# Patient Record
Sex: FEMALE | Race: WHITE | NOT HISPANIC OR LATINO | URBAN - METROPOLITAN AREA
[De-identification: names, ages, dates, MRNs, and addresses within clinical notes are randomized per-mention and may not be internally consistent; named-entity substitution may affect disease eponyms.]

---

## 2021-04-01 ENCOUNTER — APPOINTMENT (RX ONLY)
Dept: URBAN - METROPOLITAN AREA CLINIC 4 | Facility: CLINIC | Age: 56
Setting detail: DERMATOLOGY
End: 2021-04-01

## 2021-04-01 DIAGNOSIS — L57.3 POIKILODERMA OF CIVATTE: ICD-10-CM

## 2021-04-01 DIAGNOSIS — D18.0 HEMANGIOMA: ICD-10-CM

## 2021-04-01 DIAGNOSIS — Z71.89 OTHER SPECIFIED COUNSELING: ICD-10-CM

## 2021-04-01 DIAGNOSIS — L82.1 OTHER SEBORRHEIC KERATOSIS: ICD-10-CM

## 2021-04-01 DIAGNOSIS — L81.4 OTHER MELANIN HYPERPIGMENTATION: ICD-10-CM

## 2021-04-01 DIAGNOSIS — L57.0 ACTINIC KERATOSIS: ICD-10-CM

## 2021-04-01 DIAGNOSIS — D22 MELANOCYTIC NEVI: ICD-10-CM

## 2021-04-01 DIAGNOSIS — B07.8 OTHER VIRAL WARTS: ICD-10-CM

## 2021-04-01 DIAGNOSIS — L71.8 OTHER ROSACEA: ICD-10-CM

## 2021-04-01 PROBLEM — D22.62 MELANOCYTIC NEVI OF LEFT UPPER LIMB, INCLUDING SHOULDER: Status: ACTIVE | Noted: 2021-04-01

## 2021-04-01 PROBLEM — D22.72 MELANOCYTIC NEVI OF LEFT LOWER LIMB, INCLUDING HIP: Status: ACTIVE | Noted: 2021-04-01

## 2021-04-01 PROBLEM — D18.01 HEMANGIOMA OF SKIN AND SUBCUTANEOUS TISSUE: Status: ACTIVE | Noted: 2021-04-01

## 2021-04-01 PROBLEM — D22.5 MELANOCYTIC NEVI OF TRUNK: Status: ACTIVE | Noted: 2021-04-01

## 2021-04-01 PROBLEM — D22.61 MELANOCYTIC NEVI OF RIGHT UPPER LIMB, INCLUDING SHOULDER: Status: ACTIVE | Noted: 2021-04-01

## 2021-04-01 PROBLEM — D22.39 MELANOCYTIC NEVI OF OTHER PARTS OF FACE: Status: ACTIVE | Noted: 2021-04-01

## 2021-04-01 PROBLEM — D22.71 MELANOCYTIC NEVI OF RIGHT LOWER LIMB, INCLUDING HIP: Status: ACTIVE | Noted: 2021-04-01

## 2021-04-01 PROCEDURE — 99203 OFFICE O/P NEW LOW 30 MIN: CPT | Mod: 25

## 2021-04-01 PROCEDURE — ? ADDITIONAL NOTES

## 2021-04-01 PROCEDURE — ? SUNSCREEN TREATMENT REGIMEN

## 2021-04-01 PROCEDURE — 17000 DESTRUCT PREMALG LESION: CPT

## 2021-04-01 PROCEDURE — ? TREATMENT REGIMEN

## 2021-04-01 PROCEDURE — ? LIQUID NITROGEN

## 2021-04-01 PROCEDURE — 17003 DESTRUCT PREMALG LES 2-14: CPT

## 2021-04-01 PROCEDURE — ? COUNSELING

## 2021-04-01 ASSESSMENT — LOCATION ZONE DERM
LOCATION ZONE: SCALP
LOCATION ZONE: LEG
LOCATION ZONE: NOSE
LOCATION ZONE: FACE
LOCATION ZONE: ARM
LOCATION ZONE: TRUNK

## 2021-04-01 ASSESSMENT — LOCATION SIMPLE DESCRIPTION DERM
LOCATION SIMPLE: RIGHT CHEEK
LOCATION SIMPLE: LEFT FOREHEAD
LOCATION SIMPLE: LEFT UPPER ARM
LOCATION SIMPLE: RIGHT THIGH
LOCATION SIMPLE: LEFT UPPER BACK
LOCATION SIMPLE: UPPER BACK
LOCATION SIMPLE: NOSE
LOCATION SIMPLE: RIGHT PRETIBIAL REGION
LOCATION SIMPLE: LEFT FOREARM
LOCATION SIMPLE: CHEST
LOCATION SIMPLE: LEFT THIGH
LOCATION SIMPLE: RIGHT UPPER ARM
LOCATION SIMPLE: ABDOMEN
LOCATION SIMPLE: LEFT CHEEK
LOCATION SIMPLE: LEFT SCALP

## 2021-04-01 ASSESSMENT — LOCATION DETAILED DESCRIPTION DERM
LOCATION DETAILED: RIGHT ANTERIOR DISTAL UPPER ARM
LOCATION DETAILED: RIGHT MEDIAL MALAR CHEEK
LOCATION DETAILED: MIDDLE STERNUM
LOCATION DETAILED: SUPERIOR THORACIC SPINE
LOCATION DETAILED: NASAL DORSUM
LOCATION DETAILED: LEFT INFERIOR CENTRAL MALAR CHEEK
LOCATION DETAILED: EPIGASTRIC SKIN
LOCATION DETAILED: LEFT SUPERIOR MEDIAL UPPER BACK
LOCATION DETAILED: LOWER STERNUM
LOCATION DETAILED: LEFT MEDIAL FRONTAL SCALP
LOCATION DETAILED: LEFT ANTERIOR PROXIMAL THIGH
LOCATION DETAILED: LEFT INFERIOR MEDIAL FOREHEAD
LOCATION DETAILED: LEFT CENTRAL MALAR CHEEK
LOCATION DETAILED: INFERIOR THORACIC SPINE
LOCATION DETAILED: LEFT VENTRAL DISTAL FOREARM
LOCATION DETAILED: LEFT ANTERIOR DISTAL UPPER ARM
LOCATION DETAILED: LEFT MEDIAL UPPER BACK
LOCATION DETAILED: RIGHT DISTAL PRETIBIAL REGION
LOCATION DETAILED: LEFT LATERAL SUPERIOR CHEST
LOCATION DETAILED: RIGHT ANTERIOR PROXIMAL UPPER ARM
LOCATION DETAILED: RIGHT INFERIOR MEDIAL MALAR CHEEK
LOCATION DETAILED: RIGHT MEDIAL SUPERIOR CHEST
LOCATION DETAILED: RIGHT ANTERIOR PROXIMAL THIGH

## 2021-04-01 NOTE — PROCEDURE: ADDITIONAL NOTES
Detail Level: Simple
Render Risk Assessment In Note?: no
Additional Notes: Offered Rx for Metrocream \\nPatient declined at this time.
Additional Notes: Treatment options including LN2, OTC topicals, Rx topicals and others discussed

## 2021-04-01 NOTE — PROCEDURE: TREATMENT REGIMEN
Initiate Treatment: Recommend the following 5 days per week for 6-8 weeks:\\nSoak affected area in hot water in the evening for 10 minutes.\\nIf advised to do so, file the area with a disposable file and discard the file. Take care not to file the surrounding normal skin as this may spread the virus. \\nApply OTC 40% salicylic acid (paste, liquid, corn pads are all reasonable) to the areas and cover. May cover with duct tape to help the medication stay on the skin over night. \\n\\nHold for excessive irritation or if wound develops.
Detail Level: Detailed

## 2021-04-01 NOTE — PROCEDURE: LIQUID NITROGEN
Detail Level: Detailed
Render Note In Bullet Format When Appropriate: No
Duration Of Freeze Thaw-Cycle (Seconds): 3
Consent: The patient's consent was obtained including but not limited to risks of crusting, scabbing, blistering, scarring, darker or lighter pigmentary change, recurrence, incomplete removal and infection.
Post-Care Instructions: I reviewed with the patient in detail post-care instructions. Patient is to wear sunprotection, and avoid picking at any of the treated lesions. Pt may apply Vaseline to crusted or scabbing areas.

## 2022-04-06 ENCOUNTER — APPOINTMENT (RX ONLY)
Dept: URBAN - METROPOLITAN AREA CLINIC 35 | Facility: CLINIC | Age: 57
Setting detail: DERMATOLOGY
End: 2022-04-06

## 2022-04-06 VITALS — HEIGHT: 59 IN | WEIGHT: 194 LBS

## 2022-04-06 DIAGNOSIS — B07.8 OTHER VIRAL WARTS: ICD-10-CM

## 2022-04-06 DIAGNOSIS — L98.8 OTHER SPECIFIED DISORDERS OF THE SKIN AND SUBCUTANEOUS TISSUE: ICD-10-CM

## 2022-04-06 DIAGNOSIS — L81.4 OTHER MELANIN HYPERPIGMENTATION: ICD-10-CM

## 2022-04-06 DIAGNOSIS — L82.1 OTHER SEBORRHEIC KERATOSIS: ICD-10-CM

## 2022-04-06 DIAGNOSIS — Z71.89 OTHER SPECIFIED COUNSELING: ICD-10-CM

## 2022-04-06 DIAGNOSIS — L57.0 ACTINIC KERATOSIS: ICD-10-CM

## 2022-04-06 DIAGNOSIS — D22 MELANOCYTIC NEVI: ICD-10-CM

## 2022-04-06 DIAGNOSIS — D17 BENIGN LIPOMATOUS NEOPLASM: ICD-10-CM

## 2022-04-06 PROBLEM — D22.5 MELANOCYTIC NEVI OF TRUNK: Status: ACTIVE | Noted: 2022-04-06

## 2022-04-06 PROBLEM — D17.1 BENIGN LIPOMATOUS NEOPLASM OF SKIN AND SUBCUTANEOUS TISSUE OF TRUNK: Status: ACTIVE | Noted: 2022-04-06

## 2022-04-06 PROCEDURE — ? LIQUID NITROGEN

## 2022-04-06 PROCEDURE — ? COUNSELING

## 2022-04-06 PROCEDURE — 17110 DESTRUCTION B9 LES UP TO 14: CPT | Mod: 52

## 2022-04-06 PROCEDURE — 99213 OFFICE O/P EST LOW 20 MIN: CPT | Mod: 25

## 2022-04-06 PROCEDURE — ? SUNSCREEN RECOMMENDATIONS

## 2022-04-06 PROCEDURE — 17003 DESTRUCT PREMALG LES 2-14: CPT | Mod: 59

## 2022-04-06 PROCEDURE — 17000 DESTRUCT PREMALG LESION: CPT | Mod: 59

## 2022-04-06 ASSESSMENT — LOCATION ZONE DERM
LOCATION ZONE: NOSE
LOCATION ZONE: ARM
LOCATION ZONE: FACE
LOCATION ZONE: TRUNK
LOCATION ZONE: LIP

## 2022-04-06 ASSESSMENT — LOCATION DETAILED DESCRIPTION DERM
LOCATION DETAILED: RIGHT MEDIAL SUPERIOR CHEST
LOCATION DETAILED: LEFT MEDIAL ELBOW
LOCATION DETAILED: INFERIOR THORACIC SPINE
LOCATION DETAILED: LEFT MEDIAL EYEBROW
LOCATION DETAILED: RIGHT SUPERIOR UPPER BACK
LOCATION DETAILED: LEFT INFERIOR VERMILION LIP
LOCATION DETAILED: LEFT POSTERIOR SHOULDER
LOCATION DETAILED: NASAL DORSUM

## 2022-04-06 ASSESSMENT — LOCATION SIMPLE DESCRIPTION DERM
LOCATION SIMPLE: LEFT SHOULDER
LOCATION SIMPLE: NOSE
LOCATION SIMPLE: LEFT EYEBROW
LOCATION SIMPLE: RIGHT UPPER BACK
LOCATION SIMPLE: UPPER BACK
LOCATION SIMPLE: CHEST
LOCATION SIMPLE: LEFT ELBOW
LOCATION SIMPLE: LEFT LIP

## 2022-04-06 NOTE — HPI: FULL BODY SKIN EXAMINATION
What Type Of Note Output Would You Prefer (Optional)?: Bullet Format
What Is The Reason For Today's Visit?: Full Body Skin Examination
What Is The Reason For Today's Visit? (Being Monitored For X): concerning skin lesions on an annual basis
Additional History: Patient has a list, but no spots hurting or bleeding. Just wants to know they are okay.

## 2022-04-06 NOTE — PROCEDURE: LIQUID NITROGEN
Post-Care Instructions: I reviewed with the patient in detail post-care instructions. Patient is to wear sunprotection, and avoid picking at any of the treated lesions. Pt may apply Vaseline to crusted or scabbing areas.
Duration Of Freeze Thaw-Cycle (Seconds): 2
Show Aperture Variable?: Yes
Detail Level: Detailed
Render Note In Bullet Format When Appropriate: No
Consent: The patient's consent was obtained including but not limited to risks of crusting, scabbing, blistering, scarring, darker or lighter pigmentary change, recurrence, incomplete removal and infection.
Number Of Freeze-Thaw Cycles: 2 freeze-thaw cycles
Spray Paint Text: The liquid nitrogen was applied to the skin utilizing a spray paint frosting technique.
Medical Necessity Information: It is in your best interest to select a reason for this procedure from the list below. All of these items fulfill various CMS LCD requirements except the new and changing color options.
Medical Necessity Clause: This procedure was medically necessary because the lesions that were treated were:

## 2022-11-15 ENCOUNTER — APPOINTMENT (RX ONLY)
Dept: URBAN - METROPOLITAN AREA CLINIC 36 | Facility: CLINIC | Age: 57
Setting detail: DERMATOLOGY
End: 2022-11-15

## 2022-11-15 DIAGNOSIS — Z41.9 ENCOUNTER FOR PROCEDURE FOR PURPOSES OTHER THAN REMEDYING HEALTH STATE, UNSPECIFIED: ICD-10-CM

## 2022-11-15 PROCEDURE — ? EVO ND:YAG 1064NM

## 2022-11-15 ASSESSMENT — LOCATION DETAILED DESCRIPTION DERM: LOCATION DETAILED: LEFT INFERIOR VERMILION LIP

## 2022-11-15 ASSESSMENT — LOCATION SIMPLE DESCRIPTION DERM: LOCATION SIMPLE: LEFT LIP

## 2022-11-15 ASSESSMENT — LOCATION ZONE DERM: LOCATION ZONE: LIP

## 2022-11-15 NOTE — PROCEDURE: EVO ND:YAG 1064NM
Location Override: bottom lip
External Cooling Fan Speed: 0
Price (Use Numbers Only, No Special Characters Or $): 75.00
Add Another Setting?: no
Consent: Written consent obtained.  Risks reviewed including but not limited to erythema, swelling, crusting, scabbing, blistering, scarring, and darker or lighter pigmentary change.  Patient understands that results are not guaranteed and multiple treatments may be needed to achieve desired result.
Pulse Duration (Ms): 10
Procedure Note: Treatment was administered using the setting parameters listed above.
Fluence (J/Cm2): 07
Anesthesia Type: 1% lidocaine with epinephrine
Post-Care Instructions: I reviewed with the patient in detail post-care instructions. Patient should avoid sun exposure before and after treatment.  Diligent sunscreen use and sun avoidance advised.
Detail Level: Simple

## 2023-01-06 ENCOUNTER — APPOINTMENT (RX ONLY)
Dept: URBAN - METROPOLITAN AREA CLINIC 36 | Facility: CLINIC | Age: 58
Setting detail: DERMATOLOGY
End: 2023-01-06

## 2023-01-06 DIAGNOSIS — Z41.9 ENCOUNTER FOR PROCEDURE FOR PURPOSES OTHER THAN REMEDYING HEALTH STATE, UNSPECIFIED: ICD-10-CM

## 2023-01-06 PROCEDURE — ? FRAXEL

## 2023-01-06 ASSESSMENT — LOCATION SIMPLE DESCRIPTION DERM: LOCATION SIMPLE: LEFT LIP

## 2023-01-06 ASSESSMENT — LOCATION DETAILED DESCRIPTION DERM: LOCATION DETAILED: LEFT INFERIOR VERMILION LIP

## 2023-01-06 ASSESSMENT — LOCATION ZONE DERM: LOCATION ZONE: LIP

## 2023-01-06 NOTE — PROCEDURE: FRAXEL
Number Of Passes: 1
Detail Level: Simple
Energy(Mj/Cm2): 20
Total Coverage: 35%
Small Metal Eye Shield Text: The ocular mucosa was anesthetized with tetracaine. Once adequate anesthesia was optained, small metal eye shields were inserted and remained in place until the procedure was completed.
Treatment Level: 4
Post-Care Instructions: I reviewed with the patient in detail post-care instructions. Patient should avoid sun until area fully healed.
Wavelength: 1927nm
Location: decolletage of the chest
Location: dorsal forearms
Large Plastic Eye Shield Text: The ocular mucosa was anesthetized with tetracaine. Once adequate anesthesia was optained, large plastic eye shields were inserted and remained in place until the procedure was completed.
Indication: photodamage
Total Coverage: 11%
Tip: 7mm
Medium Metal Eye Shield Text: The ocular mucosa was anesthetized with tetracaine. Once adequate anesthesia was optained, medium metal eye shields were inserted and remained in place until the procedure was completed.
Tip: 15mm
Location: lower cutaneous lip
Large Metal Eye Shield Text: The ocular mucosa was anesthetized with tetracaine. Once adequate anesthesia was optained, large metal eye shields were inserted and remained in place until the procedure was completed.
Location: full face
External Cooling: Red Cryo 5
Small Plastic Eye Shield Text: The ocular mucosa was anesthetized with tetracaine. Once adequate anesthesia was optained, small plastic eye shields were inserted and remained in place until the procedure was completed.
Location: dorsal arms
Consent: Written consent obtained, risks reviewed including but not limited to pain and incomplete improvement.
External Cooling Fan Speed: 6
Total Coverage: 5%
Price (Use Numbers Only, No Special Characters Or $): 78.78
Was An Eye Shield Used?: No
Medium Plastic Eye Shield Text: The ocular mucosa was anesthetized with tetracaine. Once adequate anesthesia was optained, medium plastic eye shields were inserted and remained in place until the procedure was completed.
Energy(Mj/Cm2): 25

## 2023-01-25 ENCOUNTER — APPOINTMENT (RX ONLY)
Dept: URBAN - METROPOLITAN AREA CLINIC 36 | Facility: CLINIC | Age: 58
Setting detail: DERMATOLOGY
End: 2023-01-25

## 2023-01-25 DIAGNOSIS — Z41.9 ENCOUNTER FOR PROCEDURE FOR PURPOSES OTHER THAN REMEDYING HEALTH STATE, UNSPECIFIED: ICD-10-CM

## 2023-01-25 PROCEDURE — ? FACIAL

## 2023-01-25 ASSESSMENT — LOCATION SIMPLE DESCRIPTION DERM
LOCATION SIMPLE: RIGHT CHEEK
LOCATION SIMPLE: NOSE
LOCATION SIMPLE: LEFT CHEEK
LOCATION SIMPLE: CHIN
LOCATION SIMPLE: INFERIOR FOREHEAD

## 2023-01-25 ASSESSMENT — LOCATION DETAILED DESCRIPTION DERM
LOCATION DETAILED: LEFT CENTRAL MALAR CHEEK
LOCATION DETAILED: INFERIOR MID FOREHEAD
LOCATION DETAILED: NASAL TIP
LOCATION DETAILED: RIGHT CHIN
LOCATION DETAILED: RIGHT CENTRAL MALAR CHEEK

## 2023-01-25 ASSESSMENT — LOCATION ZONE DERM
LOCATION ZONE: FACE
LOCATION ZONE: NOSE

## 2023-01-25 NOTE — PROCEDURE: FACIAL
Extraction Method: comedone extractor
Treatment Serum (Optional): PhytoCorrect
Mask Type (Optional): cream based
Detail Level: Generalized
Exfoliation Type: enzyme
Treatment Type (Optional): Deep Cleansing Facial
Comments (Non-Sticky): Get sfacial 1x annually rec: 4x’s annually.  Sampled epionce barrier lotion & light barrier lotion.  Says she gets too hot & sweaty to wear moisturizer at night & spf in the day.  Rec physical fusion. Significant broken capillaries throughout cheeks, chin, nose
Price (Use Numbers Only, No Special Characters Or $): 152
Cryo Stick Massage (Optional): no

## 2023-05-09 ENCOUNTER — APPOINTMENT (RX ONLY)
Dept: URBAN - METROPOLITAN AREA CLINIC 35 | Facility: CLINIC | Age: 58
Setting detail: DERMATOLOGY
End: 2023-05-09

## 2023-05-09 DIAGNOSIS — L71.8 OTHER ROSACEA: ICD-10-CM

## 2023-05-09 DIAGNOSIS — Z71.89 OTHER SPECIFIED COUNSELING: ICD-10-CM

## 2023-05-09 DIAGNOSIS — D22 MELANOCYTIC NEVI: ICD-10-CM

## 2023-05-09 DIAGNOSIS — D17 BENIGN LIPOMATOUS NEOPLASM: ICD-10-CM

## 2023-05-09 DIAGNOSIS — L98.8 OTHER SPECIFIED DISORDERS OF THE SKIN AND SUBCUTANEOUS TISSUE: ICD-10-CM

## 2023-05-09 DIAGNOSIS — D18.0 HEMANGIOMA: ICD-10-CM

## 2023-05-09 DIAGNOSIS — B07.8 OTHER VIRAL WARTS: ICD-10-CM

## 2023-05-09 DIAGNOSIS — L82.1 OTHER SEBORRHEIC KERATOSIS: ICD-10-CM

## 2023-05-09 DIAGNOSIS — L81.4 OTHER MELANIN HYPERPIGMENTATION: ICD-10-CM

## 2023-05-09 PROBLEM — D22.5 MELANOCYTIC NEVI OF TRUNK: Status: ACTIVE | Noted: 2023-05-09

## 2023-05-09 PROBLEM — D17.1 BENIGN LIPOMATOUS NEOPLASM OF SKIN AND SUBCUTANEOUS TISSUE OF TRUNK: Status: ACTIVE | Noted: 2023-05-09

## 2023-05-09 PROBLEM — D18.01 HEMANGIOMA OF SKIN AND SUBCUTANEOUS TISSUE: Status: ACTIVE | Noted: 2023-05-09

## 2023-05-09 PROCEDURE — ? SUNSCREEN RECOMMENDATIONS

## 2023-05-09 PROCEDURE — ? COUNSELING

## 2023-05-09 PROCEDURE — 99213 OFFICE O/P EST LOW 20 MIN: CPT

## 2023-05-09 ASSESSMENT — LOCATION SIMPLE DESCRIPTION DERM
LOCATION SIMPLE: RIGHT UPPER BACK
LOCATION SIMPLE: LEFT LIP
LOCATION SIMPLE: LEFT CHEEK
LOCATION SIMPLE: RIGHT CHEEK
LOCATION SIMPLE: CHEST
LOCATION SIMPLE: UPPER BACK
LOCATION SIMPLE: LEFT SHOULDER

## 2023-05-09 ASSESSMENT — LOCATION DETAILED DESCRIPTION DERM
LOCATION DETAILED: LEFT INFERIOR VERMILION LIP
LOCATION DETAILED: LEFT CENTRAL MALAR CHEEK
LOCATION DETAILED: LEFT POSTERIOR SHOULDER
LOCATION DETAILED: RIGHT MEDIAL MALAR CHEEK
LOCATION DETAILED: INFERIOR THORACIC SPINE
LOCATION DETAILED: RIGHT MEDIAL SUPERIOR CHEST
LOCATION DETAILED: RIGHT SUPERIOR UPPER BACK

## 2023-05-09 ASSESSMENT — LOCATION ZONE DERM
LOCATION ZONE: TRUNK
LOCATION ZONE: LIP
LOCATION ZONE: ARM
LOCATION ZONE: FACE

## 2023-10-23 ENCOUNTER — HOSPITAL ENCOUNTER (OUTPATIENT)
Dept: LAB | Facility: MEDICAL CENTER | Age: 58
End: 2023-10-23
Attending: NURSE PRACTITIONER
Payer: COMMERCIAL

## 2023-10-23 ENCOUNTER — HOSPITAL ENCOUNTER (OUTPATIENT)
Facility: MEDICAL CENTER | Age: 58
End: 2023-10-25
Attending: EMERGENCY MEDICINE | Admitting: HOSPITALIST
Payer: COMMERCIAL

## 2023-10-23 ENCOUNTER — HOSPITAL ENCOUNTER (OUTPATIENT)
Dept: RADIOLOGY | Facility: MEDICAL CENTER | Age: 58
End: 2023-10-23
Attending: NURSE PRACTITIONER
Payer: COMMERCIAL

## 2023-10-23 DIAGNOSIS — R10.31 ABDOMINAL PAIN, RIGHT LOWER QUADRANT: ICD-10-CM

## 2023-10-23 DIAGNOSIS — K35.30 ACUTE APPENDICITIS WITH LOCALIZED PERITONITIS, WITHOUT PERFORATION, ABSCESS, OR GANGRENE: ICD-10-CM

## 2023-10-23 DIAGNOSIS — R10.31 ABDOMINAL PAIN, RLQ: ICD-10-CM

## 2023-10-23 DIAGNOSIS — R10.31 RIGHT LOWER QUADRANT ABDOMINAL PAIN: ICD-10-CM

## 2023-10-23 PROBLEM — R10.9 INTRACTABLE ABDOMINAL PAIN: Status: ACTIVE | Noted: 2023-10-23

## 2023-10-23 LAB
ALBUMIN SERPL BCP-MCNC: 4.7 G/DL (ref 3.2–4.9)
ALBUMIN SERPL BCP-MCNC: 4.9 G/DL (ref 3.2–4.9)
ALBUMIN/GLOB SERPL: 1.7 G/DL
ALBUMIN/GLOB SERPL: 1.8 G/DL
ALP SERPL-CCNC: 65 U/L (ref 30–99)
ALP SERPL-CCNC: 66 U/L (ref 30–99)
ALT SERPL-CCNC: 24 U/L (ref 2–50)
ALT SERPL-CCNC: 24 U/L (ref 2–50)
ANION GAP SERPL CALC-SCNC: 15 MMOL/L (ref 7–16)
ANION GAP SERPL CALC-SCNC: 15 MMOL/L (ref 7–16)
APPEARANCE UR: CLEAR
AST SERPL-CCNC: 23 U/L (ref 12–45)
AST SERPL-CCNC: 28 U/L (ref 12–45)
BASOPHILS # BLD AUTO: 0.4 % (ref 0–1.8)
BASOPHILS # BLD AUTO: 0.6 % (ref 0–1.8)
BASOPHILS # BLD: 0.02 K/UL (ref 0–0.12)
BASOPHILS # BLD: 0.03 K/UL (ref 0–0.12)
BILIRUB SERPL-MCNC: 0.7 MG/DL (ref 0.1–1.5)
BILIRUB SERPL-MCNC: 0.7 MG/DL (ref 0.1–1.5)
BILIRUB UR QL STRIP.AUTO: NEGATIVE
BUN SERPL-MCNC: 13 MG/DL (ref 8–22)
BUN SERPL-MCNC: 15 MG/DL (ref 8–22)
CALCIUM ALBUM COR SERPL-MCNC: 9.1 MG/DL (ref 8.5–10.5)
CALCIUM ALBUM COR SERPL-MCNC: 9.2 MG/DL (ref 8.5–10.5)
CALCIUM SERPL-MCNC: 9.7 MG/DL (ref 8.4–10.2)
CALCIUM SERPL-MCNC: 9.9 MG/DL (ref 8.4–10.2)
CHLORIDE SERPL-SCNC: 100 MMOL/L (ref 96–112)
CHLORIDE SERPL-SCNC: 104 MMOL/L (ref 96–112)
CO2 SERPL-SCNC: 24 MMOL/L (ref 20–33)
CO2 SERPL-SCNC: 24 MMOL/L (ref 20–33)
COLOR UR: YELLOW
CREAT SERPL-MCNC: 0.73 MG/DL (ref 0.5–1.4)
CREAT SERPL-MCNC: 0.73 MG/DL (ref 0.5–1.4)
CRP SERPL HS-MCNC: 0.37 MG/DL (ref 0–0.75)
EOSINOPHIL # BLD AUTO: 0.07 K/UL (ref 0–0.51)
EOSINOPHIL # BLD AUTO: 0.07 K/UL (ref 0–0.51)
EOSINOPHIL NFR BLD: 1.3 % (ref 0–6.9)
EOSINOPHIL NFR BLD: 1.4 % (ref 0–6.9)
ERYTHROCYTE [DISTWIDTH] IN BLOOD BY AUTOMATED COUNT: 42.1 FL (ref 35.9–50)
ERYTHROCYTE [DISTWIDTH] IN BLOOD BY AUTOMATED COUNT: 43.3 FL (ref 35.9–50)
GFR SERPLBLD CREATININE-BSD FMLA CKD-EPI: 94 ML/MIN/1.73 M 2
GFR SERPLBLD CREATININE-BSD FMLA CKD-EPI: 94 ML/MIN/1.73 M 2
GLOBULIN SER CALC-MCNC: 2.7 G/DL (ref 1.9–3.5)
GLOBULIN SER CALC-MCNC: 2.8 G/DL (ref 1.9–3.5)
GLUCOSE SERPL-MCNC: 85 MG/DL (ref 65–99)
GLUCOSE SERPL-MCNC: 93 MG/DL (ref 65–99)
GLUCOSE UR STRIP.AUTO-MCNC: NEGATIVE MG/DL
HCT VFR BLD AUTO: 39.3 % (ref 37–47)
HCT VFR BLD AUTO: 40.2 % (ref 37–47)
HGB BLD-MCNC: 13.6 G/DL (ref 12–16)
HGB BLD-MCNC: 13.8 G/DL (ref 12–16)
IMM GRANULOCYTES # BLD AUTO: 0.01 K/UL (ref 0–0.11)
IMM GRANULOCYTES # BLD AUTO: 0.02 K/UL (ref 0–0.11)
IMM GRANULOCYTES NFR BLD AUTO: 0.2 % (ref 0–0.9)
IMM GRANULOCYTES NFR BLD AUTO: 0.4 % (ref 0–0.9)
KETONES UR STRIP.AUTO-MCNC: NEGATIVE MG/DL
LEUKOCYTE ESTERASE UR QL STRIP.AUTO: NEGATIVE
LIPASE SERPL-CCNC: 36 U/L (ref 11–82)
LYMPHOCYTES # BLD AUTO: 1.76 K/UL (ref 1–4.8)
LYMPHOCYTES # BLD AUTO: 2.1 K/UL (ref 1–4.8)
LYMPHOCYTES NFR BLD: 33.7 % (ref 22–41)
LYMPHOCYTES NFR BLD: 40.5 % (ref 22–41)
MCH RBC QN AUTO: 33.5 PG (ref 27–33)
MCH RBC QN AUTO: 33.7 PG (ref 27–33)
MCHC RBC AUTO-ENTMCNC: 34.3 G/DL (ref 32.2–35.5)
MCHC RBC AUTO-ENTMCNC: 34.6 G/DL (ref 32.2–35.5)
MCV RBC AUTO: 96.8 FL (ref 81.4–97.8)
MCV RBC AUTO: 98.3 FL (ref 81.4–97.8)
MICRO URNS: NORMAL
MONOCYTES # BLD AUTO: 0.49 K/UL (ref 0–0.85)
MONOCYTES # BLD AUTO: 0.53 K/UL (ref 0–0.85)
MONOCYTES NFR BLD AUTO: 10.2 % (ref 0–13.4)
MONOCYTES NFR BLD AUTO: 9.4 % (ref 0–13.4)
NEUTROPHILS # BLD AUTO: 2.44 K/UL (ref 1.82–7.42)
NEUTROPHILS # BLD AUTO: 2.86 K/UL (ref 1.82–7.42)
NEUTROPHILS NFR BLD: 47.1 % (ref 44–72)
NEUTROPHILS NFR BLD: 54.8 % (ref 44–72)
NITRITE UR QL STRIP.AUTO: NEGATIVE
NRBC # BLD AUTO: 0 K/UL
NRBC # BLD AUTO: 0 K/UL
NRBC BLD-RTO: 0 /100 WBC (ref 0–0.2)
NRBC BLD-RTO: 0 /100 WBC (ref 0–0.2)
PH UR STRIP.AUTO: 7 [PH] (ref 5–8)
PLATELET # BLD AUTO: 177 K/UL (ref 164–446)
PLATELET # BLD AUTO: 238 K/UL (ref 164–446)
PMV BLD AUTO: 10.2 FL (ref 9–12.9)
PMV BLD AUTO: 9.8 FL (ref 9–12.9)
POTASSIUM SERPL-SCNC: 3.8 MMOL/L (ref 3.6–5.5)
POTASSIUM SERPL-SCNC: 4.1 MMOL/L (ref 3.6–5.5)
PROT SERPL-MCNC: 7.5 G/DL (ref 6–8.2)
PROT SERPL-MCNC: 7.6 G/DL (ref 6–8.2)
PROT UR QL STRIP: NEGATIVE MG/DL
RBC # BLD AUTO: 4.06 M/UL (ref 4.2–5.4)
RBC # BLD AUTO: 4.09 M/UL (ref 4.2–5.4)
RBC UR QL AUTO: NEGATIVE
SODIUM SERPL-SCNC: 139 MMOL/L (ref 135–145)
SODIUM SERPL-SCNC: 143 MMOL/L (ref 135–145)
SP GR UR STRIP.AUTO: <=1.005
WBC # BLD AUTO: 5.2 K/UL (ref 4.8–10.8)
WBC # BLD AUTO: 5.2 K/UL (ref 4.8–10.8)

## 2023-10-23 PROCEDURE — 80053 COMPREHEN METABOLIC PANEL: CPT | Mod: 91

## 2023-10-23 PROCEDURE — 85025 COMPLETE CBC W/AUTO DIFF WBC: CPT

## 2023-10-23 PROCEDURE — 99223 1ST HOSP IP/OBS HIGH 75: CPT | Performed by: HOSPITALIST

## 2023-10-23 PROCEDURE — 94760 N-INVAS EAR/PLS OXIMETRY 1: CPT

## 2023-10-23 PROCEDURE — 80053 COMPREHEN METABOLIC PANEL: CPT

## 2023-10-23 PROCEDURE — 700117 HCHG RX CONTRAST REV CODE 255: Performed by: NURSE PRACTITIONER

## 2023-10-23 PROCEDURE — 36415 COLL VENOUS BLD VENIPUNCTURE: CPT

## 2023-10-23 PROCEDURE — 86140 C-REACTIVE PROTEIN: CPT

## 2023-10-23 PROCEDURE — 83690 ASSAY OF LIPASE: CPT

## 2023-10-23 PROCEDURE — 96374 THER/PROPH/DIAG INJ IV PUSH: CPT

## 2023-10-23 PROCEDURE — 74177 CT ABD & PELVIS W/CONTRAST: CPT

## 2023-10-23 PROCEDURE — 99285 EMERGENCY DEPT VISIT HI MDM: CPT

## 2023-10-23 PROCEDURE — 81003 URINALYSIS AUTO W/O SCOPE: CPT

## 2023-10-23 PROCEDURE — 700111 HCHG RX REV CODE 636 W/ 250 OVERRIDE (IP): Mod: JZ | Performed by: EMERGENCY MEDICINE

## 2023-10-23 PROCEDURE — G0378 HOSPITAL OBSERVATION PER HR: HCPCS

## 2023-10-23 PROCEDURE — 85025 COMPLETE CBC W/AUTO DIFF WBC: CPT | Mod: 91

## 2023-10-23 PROCEDURE — 96375 TX/PRO/DX INJ NEW DRUG ADDON: CPT

## 2023-10-23 RX ORDER — LEVOTHYROXINE SODIUM 88 UG/1
88 TABLET ORAL
COMMUNITY

## 2023-10-23 RX ORDER — METRONIDAZOLE 500 MG/100ML
500 INJECTION, SOLUTION INTRAVENOUS ONCE
Status: COMPLETED | OUTPATIENT
Start: 2023-10-23 | End: 2023-10-23

## 2023-10-23 RX ORDER — PROCHLORPERAZINE EDISYLATE 5 MG/ML
5-10 INJECTION INTRAMUSCULAR; INTRAVENOUS EVERY 4 HOURS PRN
Status: DISCONTINUED | OUTPATIENT
Start: 2023-10-23 | End: 2023-10-25 | Stop reason: HOSPADM

## 2023-10-23 RX ORDER — BISACODYL 10 MG
10 SUPPOSITORY, RECTAL RECTAL
Status: DISCONTINUED | OUTPATIENT
Start: 2023-10-23 | End: 2023-10-25 | Stop reason: HOSPADM

## 2023-10-23 RX ORDER — HYDROMORPHONE HYDROCHLORIDE 1 MG/ML
1 INJECTION, SOLUTION INTRAMUSCULAR; INTRAVENOUS; SUBCUTANEOUS
Status: DISCONTINUED | OUTPATIENT
Start: 2023-10-23 | End: 2023-10-25 | Stop reason: HOSPADM

## 2023-10-23 RX ORDER — PROMETHAZINE HYDROCHLORIDE 25 MG/1
12.5-25 TABLET ORAL EVERY 4 HOURS PRN
Status: DISCONTINUED | OUTPATIENT
Start: 2023-10-23 | End: 2023-10-25 | Stop reason: HOSPADM

## 2023-10-23 RX ORDER — ONDANSETRON 4 MG/1
4 TABLET, ORALLY DISINTEGRATING ORAL EVERY 4 HOURS PRN
Status: DISCONTINUED | OUTPATIENT
Start: 2023-10-23 | End: 2023-10-25 | Stop reason: HOSPADM

## 2023-10-23 RX ORDER — CEFTRIAXONE 1 G/1
1000 INJECTION, POWDER, FOR SOLUTION INTRAMUSCULAR; INTRAVENOUS ONCE
Status: COMPLETED | OUTPATIENT
Start: 2023-10-23 | End: 2023-10-23

## 2023-10-23 RX ORDER — HYDROMORPHONE HYDROCHLORIDE 1 MG/ML
0.5 INJECTION, SOLUTION INTRAMUSCULAR; INTRAVENOUS; SUBCUTANEOUS
Status: DISCONTINUED | OUTPATIENT
Start: 2023-10-23 | End: 2023-10-25 | Stop reason: HOSPADM

## 2023-10-23 RX ORDER — POLYETHYLENE GLYCOL 3350 17 G/17G
1 POWDER, FOR SOLUTION ORAL
Status: DISCONTINUED | OUTPATIENT
Start: 2023-10-23 | End: 2023-10-25 | Stop reason: HOSPADM

## 2023-10-23 RX ORDER — ACETAMINOPHEN 325 MG/1
650 TABLET ORAL EVERY 6 HOURS PRN
Status: DISCONTINUED | OUTPATIENT
Start: 2023-10-23 | End: 2023-10-25 | Stop reason: HOSPADM

## 2023-10-23 RX ORDER — PROMETHAZINE HYDROCHLORIDE 25 MG/1
12.5-25 SUPPOSITORY RECTAL EVERY 4 HOURS PRN
Status: DISCONTINUED | OUTPATIENT
Start: 2023-10-23 | End: 2023-10-25 | Stop reason: HOSPADM

## 2023-10-23 RX ORDER — LEVOTHYROXINE SODIUM 88 UG/1
88 TABLET ORAL
Status: DISCONTINUED | OUTPATIENT
Start: 2023-10-24 | End: 2023-10-25 | Stop reason: HOSPADM

## 2023-10-23 RX ORDER — AMOXICILLIN 250 MG
2 CAPSULE ORAL 2 TIMES DAILY
Status: DISCONTINUED | OUTPATIENT
Start: 2023-10-24 | End: 2023-10-25 | Stop reason: HOSPADM

## 2023-10-23 RX ORDER — PAROXETINE HYDROCHLORIDE 20 MG/1
20 TABLET, FILM COATED ORAL EVERY EVENING
COMMUNITY

## 2023-10-23 RX ORDER — PAROXETINE HYDROCHLORIDE 20 MG/1
20 TABLET, FILM COATED ORAL EVERY EVENING
Status: DISCONTINUED | OUTPATIENT
Start: 2023-10-24 | End: 2023-10-25 | Stop reason: HOSPADM

## 2023-10-23 RX ORDER — BEMPEDOIC ACID AND EZETIMIBE 180; 10 MG/1; MG/1
180 TABLET, FILM COATED ORAL EVERY EVENING
COMMUNITY
Start: 2023-09-27

## 2023-10-23 RX ORDER — ONDANSETRON 2 MG/ML
4 INJECTION INTRAMUSCULAR; INTRAVENOUS EVERY 4 HOURS PRN
Status: DISCONTINUED | OUTPATIENT
Start: 2023-10-23 | End: 2023-10-25 | Stop reason: HOSPADM

## 2023-10-23 RX ADMIN — IOHEXOL 25 ML: 240 INJECTION, SOLUTION INTRATHECAL; INTRAVASCULAR; INTRAVENOUS; ORAL at 17:18

## 2023-10-23 RX ADMIN — IOHEXOL 100 ML: 350 INJECTION, SOLUTION INTRAVENOUS at 17:17

## 2023-10-23 RX ADMIN — METRONIDAZOLE 500 MG: 5 INJECTION, SOLUTION INTRAVENOUS at 21:50

## 2023-10-23 RX ADMIN — CEFTRIAXONE SODIUM 1000 MG: 1 INJECTION, POWDER, FOR SOLUTION INTRAMUSCULAR; INTRAVENOUS at 21:48

## 2023-10-23 ASSESSMENT — PATIENT HEALTH QUESTIONNAIRE - PHQ9
SUM OF ALL RESPONSES TO PHQ9 QUESTIONS 1 AND 2: 0
2. FEELING DOWN, DEPRESSED, IRRITABLE, OR HOPELESS: NOT AT ALL
1. LITTLE INTEREST OR PLEASURE IN DOING THINGS: NOT AT ALL

## 2023-10-23 ASSESSMENT — LIFESTYLE VARIABLES
TOTAL SCORE: 0
ON A TYPICAL DAY WHEN YOU DRINK ALCOHOL HOW MANY DRINKS DO YOU HAVE: 3
TOTAL SCORE: 0
CONSUMPTION TOTAL: POSITIVE
EVER HAD A DRINK FIRST THING IN THE MORNING TO STEADY YOUR NERVES TO GET RID OF A HANGOVER: NO
HAVE PEOPLE ANNOYED YOU BY CRITICIZING YOUR DRINKING: NO
AVERAGE NUMBER OF DAYS PER WEEK YOU HAVE A DRINK CONTAINING ALCOHOL: 5
EVER FELT BAD OR GUILTY ABOUT YOUR DRINKING: NO
HAVE YOU EVER FELT YOU SHOULD CUT DOWN ON YOUR DRINKING: NO
ALCOHOL_USE: YES
HOW MANY TIMES IN THE PAST YEAR HAVE YOU HAD 5 OR MORE DRINKS IN A DAY: 2
TOTAL SCORE: 0

## 2023-10-23 ASSESSMENT — FIBROSIS 4 INDEX
FIB4 SCORE: 1.18
FIB4 SCORE: 1.94

## 2023-10-23 ASSESSMENT — PAIN DESCRIPTION - PAIN TYPE
TYPE: ACUTE PAIN
TYPE: ACUTE PAIN

## 2023-10-24 ENCOUNTER — ANESTHESIA (OUTPATIENT)
Dept: SURGERY | Facility: MEDICAL CENTER | Age: 58
End: 2023-10-24
Payer: COMMERCIAL

## 2023-10-24 ENCOUNTER — ANESTHESIA EVENT (OUTPATIENT)
Dept: SURGERY | Facility: MEDICAL CENTER | Age: 58
End: 2023-10-24
Payer: COMMERCIAL

## 2023-10-24 PROBLEM — E03.9 HYPOTHYROIDISM: Status: ACTIVE | Noted: 2023-10-24

## 2023-10-24 PROBLEM — F32.A DEPRESSION: Status: ACTIVE | Noted: 2023-10-24

## 2023-10-24 PROBLEM — R10.31 ABDOMINAL PAIN, RLQ: Status: ACTIVE | Noted: 2023-10-24

## 2023-10-24 LAB
ANION GAP SERPL CALC-SCNC: 14 MMOL/L (ref 7–16)
BUN SERPL-MCNC: 12 MG/DL (ref 8–22)
CALCIUM SERPL-MCNC: 9.3 MG/DL (ref 8.4–10.2)
CHLORIDE SERPL-SCNC: 104 MMOL/L (ref 96–112)
CO2 SERPL-SCNC: 25 MMOL/L (ref 20–33)
CREAT SERPL-MCNC: 0.69 MG/DL (ref 0.5–1.4)
CRP SERPL HS-MCNC: <0.3 MG/DL (ref 0–0.75)
ERYTHROCYTE [DISTWIDTH] IN BLOOD BY AUTOMATED COUNT: 42.3 FL (ref 35.9–50)
ERYTHROCYTE [SEDIMENTATION RATE] IN BLOOD BY WESTERGREN METHOD: 28 MM/HOUR (ref 0–25)
GFR SERPLBLD CREATININE-BSD FMLA CKD-EPI: 99 ML/MIN/1.73 M 2
GLUCOSE SERPL-MCNC: 89 MG/DL (ref 65–99)
HCT VFR BLD AUTO: 37.3 % (ref 37–47)
HGB BLD-MCNC: 13 G/DL (ref 12–16)
MCH RBC QN AUTO: 33.9 PG (ref 27–33)
MCHC RBC AUTO-ENTMCNC: 34.9 G/DL (ref 32.2–35.5)
MCV RBC AUTO: 97.1 FL (ref 81.4–97.8)
PATHOLOGY CONSULT NOTE: NORMAL
PLATELET # BLD AUTO: 170 K/UL (ref 164–446)
PMV BLD AUTO: 10 FL (ref 9–12.9)
POTASSIUM SERPL-SCNC: 3.8 MMOL/L (ref 3.6–5.5)
PROCALCITONIN SERPL-MCNC: 0.06 NG/ML
RBC # BLD AUTO: 3.84 M/UL (ref 4.2–5.4)
SODIUM SERPL-SCNC: 143 MMOL/L (ref 135–145)
WBC # BLD AUTO: 3.8 K/UL (ref 4.8–10.8)

## 2023-10-24 PROCEDURE — 700111 HCHG RX REV CODE 636 W/ 250 OVERRIDE (IP): Mod: JZ | Performed by: ANESTHESIOLOGY

## 2023-10-24 PROCEDURE — 94760 N-INVAS EAR/PLS OXIMETRY 1: CPT

## 2023-10-24 PROCEDURE — 85027 COMPLETE CBC AUTOMATED: CPT

## 2023-10-24 PROCEDURE — 700101 HCHG RX REV CODE 250: Performed by: ANESTHESIOLOGY

## 2023-10-24 PROCEDURE — 700105 HCHG RX REV CODE 258: Performed by: HOSPITALIST

## 2023-10-24 PROCEDURE — 96366 THER/PROPH/DIAG IV INF ADDON: CPT

## 2023-10-24 PROCEDURE — 88304 TISSUE EXAM BY PATHOLOGIST: CPT

## 2023-10-24 PROCEDURE — 96365 THER/PROPH/DIAG IV INF INIT: CPT

## 2023-10-24 PROCEDURE — 160048 HCHG OR STATISTICAL LEVEL 1-5: Performed by: SURGERY

## 2023-10-24 PROCEDURE — G0378 HOSPITAL OBSERVATION PER HR: HCPCS

## 2023-10-24 PROCEDURE — 700102 HCHG RX REV CODE 250 W/ 637 OVERRIDE(OP): Performed by: HOSPITALIST

## 2023-10-24 PROCEDURE — 86140 C-REACTIVE PROTEIN: CPT

## 2023-10-24 PROCEDURE — 700101 HCHG RX REV CODE 250: Performed by: SURGERY

## 2023-10-24 PROCEDURE — 160029 HCHG SURGERY MINUTES - 1ST 30 MINS LEVEL 4: Performed by: SURGERY

## 2023-10-24 PROCEDURE — 700111 HCHG RX REV CODE 636 W/ 250 OVERRIDE (IP): Performed by: HOSPITALIST

## 2023-10-24 PROCEDURE — 700105 HCHG RX REV CODE 258: Performed by: ANESTHESIOLOGY

## 2023-10-24 PROCEDURE — 160002 HCHG RECOVERY MINUTES (STAT): Performed by: SURGERY

## 2023-10-24 PROCEDURE — 96375 TX/PRO/DX INJ NEW DRUG ADDON: CPT

## 2023-10-24 PROCEDURE — 85652 RBC SED RATE AUTOMATED: CPT

## 2023-10-24 PROCEDURE — 160009 HCHG ANES TIME/MIN: Performed by: SURGERY

## 2023-10-24 PROCEDURE — 160035 HCHG PACU - 1ST 60 MINS PHASE I: Performed by: SURGERY

## 2023-10-24 PROCEDURE — A9270 NON-COVERED ITEM OR SERVICE: HCPCS | Performed by: HOSPITALIST

## 2023-10-24 PROCEDURE — 99232 SBSQ HOSP IP/OBS MODERATE 35: CPT | Performed by: STUDENT IN AN ORGANIZED HEALTH CARE EDUCATION/TRAINING PROGRAM

## 2023-10-24 PROCEDURE — 80048 BASIC METABOLIC PNL TOTAL CA: CPT

## 2023-10-24 PROCEDURE — 84145 PROCALCITONIN (PCT): CPT

## 2023-10-24 PROCEDURE — 700105 HCHG RX REV CODE 258: Performed by: STUDENT IN AN ORGANIZED HEALTH CARE EDUCATION/TRAINING PROGRAM

## 2023-10-24 RX ORDER — SODIUM CHLORIDE 9 MG/ML
INJECTION, SOLUTION INTRAVENOUS CONTINUOUS
Status: DISCONTINUED | OUTPATIENT
Start: 2023-10-24 | End: 2023-10-25 | Stop reason: HOSPADM

## 2023-10-24 RX ORDER — MEPERIDINE HYDROCHLORIDE 25 MG/ML
12.5 INJECTION INTRAMUSCULAR; INTRAVENOUS; SUBCUTANEOUS
Status: DISCONTINUED | OUTPATIENT
Start: 2023-10-24 | End: 2023-10-24 | Stop reason: HOSPADM

## 2023-10-24 RX ORDER — HYDROMORPHONE HYDROCHLORIDE 1 MG/ML
0.6 INJECTION, SOLUTION INTRAMUSCULAR; INTRAVENOUS; SUBCUTANEOUS
Status: DISCONTINUED | OUTPATIENT
Start: 2023-10-24 | End: 2023-10-24 | Stop reason: HOSPADM

## 2023-10-24 RX ORDER — THIAMINE HCL 100 MG
500 TABLET ORAL EVERY EVENING
COMMUNITY

## 2023-10-24 RX ORDER — ONDANSETRON 2 MG/ML
4 INJECTION INTRAMUSCULAR; INTRAVENOUS
Status: DISCONTINUED | OUTPATIENT
Start: 2023-10-24 | End: 2023-10-24 | Stop reason: HOSPADM

## 2023-10-24 RX ORDER — LEVONORGESTREL AND ETHINYL ESTRADIOL 0.1-0.02MG
1 KIT ORAL DAILY
COMMUNITY

## 2023-10-24 RX ORDER — HALOPERIDOL 5 MG/ML
1 INJECTION INTRAMUSCULAR
Status: DISCONTINUED | OUTPATIENT
Start: 2023-10-24 | End: 2023-10-24 | Stop reason: HOSPADM

## 2023-10-24 RX ORDER — HYDROMORPHONE HYDROCHLORIDE 1 MG/ML
0.4 INJECTION, SOLUTION INTRAMUSCULAR; INTRAVENOUS; SUBCUTANEOUS
Status: DISCONTINUED | OUTPATIENT
Start: 2023-10-24 | End: 2023-10-24 | Stop reason: HOSPADM

## 2023-10-24 RX ORDER — ACETAMINOPHEN 160 MG
2000 TABLET,DISINTEGRATING ORAL
COMMUNITY

## 2023-10-24 RX ORDER — HYDROMORPHONE HYDROCHLORIDE 1 MG/ML
0.2 INJECTION, SOLUTION INTRAMUSCULAR; INTRAVENOUS; SUBCUTANEOUS
Status: DISCONTINUED | OUTPATIENT
Start: 2023-10-24 | End: 2023-10-24 | Stop reason: HOSPADM

## 2023-10-24 RX ORDER — METRONIDAZOLE 500 MG/100ML
500 INJECTION, SOLUTION INTRAVENOUS EVERY 12 HOURS
Status: DISCONTINUED | OUTPATIENT
Start: 2023-10-24 | End: 2023-10-25

## 2023-10-24 RX ORDER — SODIUM CHLORIDE, SODIUM LACTATE, POTASSIUM CHLORIDE, CALCIUM CHLORIDE 600; 310; 30; 20 MG/100ML; MG/100ML; MG/100ML; MG/100ML
INJECTION, SOLUTION INTRAVENOUS
Status: DISCONTINUED | OUTPATIENT
Start: 2023-10-24 | End: 2023-10-24 | Stop reason: SURG

## 2023-10-24 RX ORDER — MIDAZOLAM HYDROCHLORIDE 1 MG/ML
INJECTION INTRAMUSCULAR; INTRAVENOUS PRN
Status: DISCONTINUED | OUTPATIENT
Start: 2023-10-24 | End: 2023-10-24 | Stop reason: SURG

## 2023-10-24 RX ORDER — BUPIVACAINE HYDROCHLORIDE AND EPINEPHRINE 5; 5 MG/ML; UG/ML
INJECTION, SOLUTION EPIDURAL; INTRACAUDAL; PERINEURAL
Status: DISCONTINUED | OUTPATIENT
Start: 2023-10-24 | End: 2023-10-24 | Stop reason: HOSPADM

## 2023-10-24 RX ORDER — ROCURONIUM BROMIDE 10 MG/ML
INJECTION, SOLUTION INTRAVENOUS PRN
Status: DISCONTINUED | OUTPATIENT
Start: 2023-10-24 | End: 2023-10-24 | Stop reason: SURG

## 2023-10-24 RX ORDER — ONDANSETRON 2 MG/ML
INJECTION INTRAMUSCULAR; INTRAVENOUS PRN
Status: DISCONTINUED | OUTPATIENT
Start: 2023-10-24 | End: 2023-10-24 | Stop reason: SURG

## 2023-10-24 RX ORDER — OXYCODONE HCL 5 MG/5 ML
10 SOLUTION, ORAL ORAL
Status: DISCONTINUED | OUTPATIENT
Start: 2023-10-24 | End: 2023-10-24 | Stop reason: HOSPADM

## 2023-10-24 RX ORDER — LIDOCAINE HYDROCHLORIDE 40 MG/ML
SOLUTION TOPICAL PRN
Status: DISCONTINUED | OUTPATIENT
Start: 2023-10-24 | End: 2023-10-24 | Stop reason: SURG

## 2023-10-24 RX ORDER — DEXAMETHASONE SODIUM PHOSPHATE 4 MG/ML
INJECTION, SOLUTION INTRA-ARTICULAR; INTRALESIONAL; INTRAMUSCULAR; INTRAVENOUS; SOFT TISSUE PRN
Status: DISCONTINUED | OUTPATIENT
Start: 2023-10-24 | End: 2023-10-24 | Stop reason: SURG

## 2023-10-24 RX ORDER — SODIUM CHLORIDE, SODIUM LACTATE, POTASSIUM CHLORIDE, CALCIUM CHLORIDE 600; 310; 30; 20 MG/100ML; MG/100ML; MG/100ML; MG/100ML
INJECTION, SOLUTION INTRAVENOUS CONTINUOUS
Status: DISCONTINUED | OUTPATIENT
Start: 2023-10-24 | End: 2023-10-24

## 2023-10-24 RX ORDER — CEFOTETAN DISODIUM 2 G/20ML
INJECTION, POWDER, FOR SOLUTION INTRAMUSCULAR; INTRAVENOUS PRN
Status: DISCONTINUED | OUTPATIENT
Start: 2023-10-24 | End: 2023-10-24 | Stop reason: SURG

## 2023-10-24 RX ORDER — HYDRALAZINE HYDROCHLORIDE 20 MG/ML
5 INJECTION INTRAMUSCULAR; INTRAVENOUS
Status: DISCONTINUED | OUTPATIENT
Start: 2023-10-24 | End: 2023-10-24 | Stop reason: HOSPADM

## 2023-10-24 RX ORDER — SODIUM CHLORIDE, SODIUM LACTATE, POTASSIUM CHLORIDE, CALCIUM CHLORIDE 600; 310; 30; 20 MG/100ML; MG/100ML; MG/100ML; MG/100ML
INJECTION, SOLUTION INTRAVENOUS CONTINUOUS
Status: DISCONTINUED | OUTPATIENT
Start: 2023-10-24 | End: 2023-10-24 | Stop reason: HOSPADM

## 2023-10-24 RX ORDER — OXYCODONE HCL 5 MG/5 ML
5 SOLUTION, ORAL ORAL
Status: DISCONTINUED | OUTPATIENT
Start: 2023-10-24 | End: 2023-10-24 | Stop reason: HOSPADM

## 2023-10-24 RX ORDER — DIPHENHYDRAMINE HYDROCHLORIDE 50 MG/ML
12.5 INJECTION INTRAMUSCULAR; INTRAVENOUS
Status: DISCONTINUED | OUTPATIENT
Start: 2023-10-24 | End: 2023-10-24 | Stop reason: HOSPADM

## 2023-10-24 RX ORDER — HYDRALAZINE HYDROCHLORIDE 20 MG/ML
INJECTION INTRAMUSCULAR; INTRAVENOUS PRN
Status: DISCONTINUED | OUTPATIENT
Start: 2023-10-24 | End: 2023-10-24 | Stop reason: SURG

## 2023-10-24 RX ORDER — KETOROLAC TROMETHAMINE 30 MG/ML
INJECTION, SOLUTION INTRAMUSCULAR; INTRAVENOUS PRN
Status: DISCONTINUED | OUTPATIENT
Start: 2023-10-24 | End: 2023-10-24 | Stop reason: SURG

## 2023-10-24 RX ADMIN — SODIUM CHLORIDE, POTASSIUM CHLORIDE, SODIUM LACTATE AND CALCIUM CHLORIDE: 600; 310; 30; 20 INJECTION, SOLUTION INTRAVENOUS at 16:49

## 2023-10-24 RX ADMIN — METRONIDAZOLE 500 MG: 5 INJECTION, SOLUTION INTRAVENOUS at 19:16

## 2023-10-24 RX ADMIN — PROCHLORPERAZINE EDISYLATE 10 MG: 5 INJECTION INTRAMUSCULAR; INTRAVENOUS at 18:13

## 2023-10-24 RX ADMIN — MIDAZOLAM 2 MG: 1 INJECTION, SOLUTION INTRAMUSCULAR; INTRAVENOUS at 16:49

## 2023-10-24 RX ADMIN — HYDRALAZINE HYDROCHLORIDE 10 MG: 20 INJECTION, SOLUTION INTRAMUSCULAR; INTRAVENOUS at 17:04

## 2023-10-24 RX ADMIN — FENTANYL CITRATE 100 MCG: 50 INJECTION, SOLUTION INTRAMUSCULAR; INTRAVENOUS at 16:50

## 2023-10-24 RX ADMIN — SODIUM CHLORIDE: 9 INJECTION, SOLUTION INTRAVENOUS at 08:38

## 2023-10-24 RX ADMIN — ONDANSETRON 4 MG: 2 INJECTION INTRAMUSCULAR; INTRAVENOUS at 17:08

## 2023-10-24 RX ADMIN — HYDRALAZINE HYDROCHLORIDE 10 MG: 20 INJECTION, SOLUTION INTRAMUSCULAR; INTRAVENOUS at 17:08

## 2023-10-24 RX ADMIN — DEXAMETHASONE SODIUM PHOSPHATE 4 MG: 4 INJECTION INTRA-ARTICULAR; INTRALESIONAL; INTRAMUSCULAR; INTRAVENOUS; SOFT TISSUE at 17:08

## 2023-10-24 RX ADMIN — KETOROLAC TROMETHAMINE 30 MG: 30 INJECTION, SOLUTION INTRAMUSCULAR; INTRAVENOUS at 17:08

## 2023-10-24 RX ADMIN — LIDOCAINE HYDROCHLORIDE 4 ML: 40 SOLUTION TOPICAL at 16:56

## 2023-10-24 RX ADMIN — METRONIDAZOLE 500 MG: 5 INJECTION, SOLUTION INTRAVENOUS at 05:55

## 2023-10-24 RX ADMIN — PAROXETINE HYDROCHLORIDE 20 MG: 20 TABLET, FILM COATED ORAL at 18:23

## 2023-10-24 RX ADMIN — PROPOFOL 200 MG: 10 INJECTION, EMULSION INTRAVENOUS at 16:52

## 2023-10-24 RX ADMIN — CEFTRIAXONE SODIUM 1000 MG: 1 INJECTION, POWDER, FOR SOLUTION INTRAMUSCULAR; INTRAVENOUS at 18:24

## 2023-10-24 RX ADMIN — SENNOSIDES AND DOCUSATE SODIUM 2 TABLET: 50; 8.6 TABLET ORAL at 18:23

## 2023-10-24 RX ADMIN — CEFOTETAN DISODIUM 2 G: 2 INJECTION, POWDER, FOR SOLUTION INTRAMUSCULAR; INTRAVENOUS at 16:55

## 2023-10-24 RX ADMIN — SUGAMMADEX 200 MG: 100 INJECTION, SOLUTION INTRAVENOUS at 17:15

## 2023-10-24 RX ADMIN — ROCURONIUM BROMIDE 30 MG: 50 INJECTION, SOLUTION INTRAVENOUS at 16:52

## 2023-10-24 ASSESSMENT — ENCOUNTER SYMPTOMS
PALPITATIONS: 0
DIZZINESS: 0
NAUSEA: 1
DOUBLE VISION: 0
SHORTNESS OF BREATH: 0
SORE THROAT: 0
VOMITING: 0
BLOOD IN STOOL: 0
ABDOMINAL PAIN: 1
HEADACHES: 0
BRUISES/BLEEDS EASILY: 0
NECK PAIN: 0
NAUSEA: 0
MYALGIAS: 0
DEPRESSION: 0
COUGH: 0
CHILLS: 0
INSOMNIA: 0
BLURRED VISION: 0
WEAKNESS: 0
FEVER: 0

## 2023-10-24 ASSESSMENT — PAIN DESCRIPTION - PAIN TYPE
TYPE: SURGICAL PAIN
TYPE: ACUTE PAIN
TYPE: SURGICAL PAIN
TYPE: ACUTE PAIN
TYPE: SURGICAL PAIN
TYPE: SURGICAL PAIN

## 2023-10-24 ASSESSMENT — PAIN SCALES - GENERAL: PAIN_LEVEL: 2

## 2023-10-24 ASSESSMENT — LIFESTYLE VARIABLES: SUBSTANCE_ABUSE: 0

## 2023-10-24 NOTE — HOSPITAL COURSE
Adelaide March is a 58 y.o. female, sent to the ED by PCP with concern for appendicitis on CT done outpatient earlier in the day.  She presented to the emergency department on 10/23/2023 with 6-day history of worsening right lower quadrant pain.  Pain is constant and described as sharp in nature.  Patient was seen her PCP and CT scan was ordered with above findings.  On evaluation in the ED, WBC is normal.  She is hypertensive and intermittently bradycardic but no SIRS.  ERP discussed the case with Dr. Shin who will evaluate patient. She was a started on Rocephin and metronidazole for acute appendicitis.

## 2023-10-24 NOTE — PROGRESS NOTES
4 Eyes Skin Assessment Completed by Treasure, RN and Shira, RN.    Head WDL  Ears WDL  Nose WDL  Mouth WDL  Neck WDL  Breast/Chest WDL  Shoulder Blades WDL  Spine WDL  (R) Arm/Elbow/Hand WDL  (L) Arm/Elbow/Hand WDL  Abdomen WDL  Groin WDL  Scrotum/Coccyx/Buttocks WDL  (R) Leg WDL  (L) Leg WDL  (R) Heel/Foot/Toe WDL  (L) Heel/Foot/Toe WDL      Devices In Places Pulse Ox      Interventions In Place Pressure Redistribution Mattress    Possible Skin Injury No    Pictures Uploaded Into Epic N/A  Wound Consult Placed N/A  RN Wound Prevention Protocol Ordered No

## 2023-10-24 NOTE — ED TRIAGE NOTES
Chief Complaint   Patient presents with    Abdominal Pain     Abdominal pain since last Wednesday, pt seen by pcp today, ct today, pcp sent pt to ed for results of acute appendicitis per pt     BP (!) 158/81   Pulse 66   Temp 36.2 °C (97.2 °F) (Temporal)   Resp 16   Wt 81.2 kg (179 lb)   SpO2 95%

## 2023-10-24 NOTE — ANESTHESIA PREPROCEDURE EVALUATION
Case: [id number] Date/Time: 10/24/23 1615    Procedure: APPENDECTOMY, LAPAROSCOPIC    Location: [facility] OR 02 / SURGERY [facility]    Surgeons: Shelly Shin M.D.          Relevant Problems   ENDO   (positive) Hypothyroidism       Physical Exam    Airway   Mallampati: II  TM distance: >3 FB  Neck ROM: full       Cardiovascular - normal exam  Rhythm: regular  Rate: normal  (-) murmur     Dental - normal exam           Pulmonary - normal exam  Breath sounds clear to auscultation     Abdominal    Neurological - normal exam                 Anesthesia Plan    ASA 2- EMERGENT   ASA physical status emergent criteria: acutely contaminated wound or identified infection source and acute peritonitis    Plan - general       Airway plan will be ETT          Induction: intravenous    Postoperative Plan: Postoperative administration of opioids is intended.    Pertinent diagnostic labs and testing reviewed    Informed Consent:    Anesthetic plan and risks discussed with patient.    Use of blood products discussed with: patient whom consented to blood products.

## 2023-10-24 NOTE — PROGRESS NOTES
Received report from night shift RN. Assumed patient care at 0715. Assessment completed. A&OX4. Pain 2/10, patient states this is tolerable.  Patient updated on POC for surgery today, report given to pre-op RN, patient has been NPO since breakfast yesterday.     Bed in lowest position, bed locked, call light within reach. Hourly rounding in place.

## 2023-10-24 NOTE — ASSESSMENT & PLAN NOTE
-Patient with 6-day history of worsening right lower quadrant pain and CT imaging showing concerning for early appendicitis.  -I appreciate general surgery consult and recommendations, ERP discussed the case with Dr. Shin, planning for laparoscopic appendectomy today   -There is no other significant abnormalities.  Normal WBC, no SIRS.  She is not septic on admission.  -Patient was started on Rocephin and metronidazole which we will continue, likely stop post operatively   -Pain control as needed

## 2023-10-24 NOTE — ED NOTES
IV started with blood drawn and sent to lab. No blood cultures at this time per MD     Patient updated with her plan of care with verbal understanding.

## 2023-10-24 NOTE — ANESTHESIA PROCEDURE NOTES
Airway    Date/Time: 10/24/2023 4:52 PM    Performed by: Dariusz Gusman M.D.  Authorized by: Dariusz Gusman M.D.    Location:  OR  Urgency:  Elective  Indications for Airway Management:  Anesthesia      Spontaneous Ventilation: absent    Sedation Level:  Deep  Preoxygenated: Yes    Patient Position:  Sniffing  Final Airway Type:  Endotracheal airway  Final Endotracheal Airway:  ETT  Cuffed: Yes    Technique Used for Successful ETT Placement:  Direct laryngoscopy    Insertion Site:  Oral  Blade Type:  Miguel  Laryngoscope Blade/Videolaryngoscope Blade Size:  3  ETT Size (mm):  7.0  Measured from:  Teeth  ETT to Teeth (cm):  22  Placement Verified by: auscultation and capnometry    Cormack-Lehane Classification:  Grade I - full view of glottis  Number of Attempts at Approach:  1

## 2023-10-24 NOTE — PROGRESS NOTES
[facility] Medicine Daily Progress Note    Date of Service  10/24/2023    Chief Complaint  Adelaide March is a 58 y.o. female admitted 10/23/2023 with RLQ abdominal pain     Hospital Course  Adelaide March is a 58 y.o. female, sent to the ED by PCP with concern for appendicitis on CT done outpatient earlier in the day.  She presented to the emergency department on 10/23/2023 with 6-day history of worsening right lower quadrant pain.  Pain is constant and described as sharp in nature.  Patient was seen her PCP and CT scan was ordered with above findings.  On evaluation in the ED, WBC is normal.  She is hypertensive and intermittently bradycardic but no SIRS.  ERP discussed the case with Dr. Shin who will evaluate patient. She was a started on Rocephin and metronidazole for acute appendicitis.     Interval Problem Update  Pt seen and examined, ongoing RLQ pain, some nausea but denies vomiting or diarrhea currently. Does feel hungry. No fevers or chills   - VSS  - plan for surgery today laparoscopic appendectomy  - continue abx and pain control     Post op, can advance diet, likely stop abx and if stable likely dc in th next 24 hours      I have discussed this patient's plan of care and discharge plan at IDT rounds today with Case Management, Nursing, Nursing leadership, and other members of the IDT team.    Consultants/Specialty  general surgery    Code Status  Full Code    Disposition  The patient is not medically cleared for discharge to home or a post-acute facility.      I have placed the appropriate orders for post-discharge needs.    Review of Systems  Review of Systems   Constitutional:  Positive for malaise/fatigue. Negative for chills and fever.   HENT:  Negative for sore throat.    Eyes:  Negative for blurred vision.   Respiratory:  Negative for cough and shortness of breath.    Cardiovascular:  Negative for chest pain and leg swelling.   Gastrointestinal:  Positive for abdominal pain and nausea.  Negative for blood in stool and vomiting.   Genitourinary:  Negative for dysuria.   Musculoskeletal:  Negative for myalgias.   Neurological:  Negative for dizziness.   Psychiatric/Behavioral:  Negative for substance abuse.         Physical Exam  Temp:  [36.2 °C (97.2 °F)-36.3 °C (97.3 °F)] 36.3 °C (97.3 °F)  Pulse:  [54-66] 60  Resp:  [16-18] 17  BP: (127-178)/(67-81) 127/67  SpO2:  [93 %-98 %] 96 %    Physical Exam  Vitals and nursing note reviewed.   Constitutional:       Appearance: She is normal weight. She is not ill-appearing.   HENT:      Head: Normocephalic and atraumatic.      Mouth/Throat:      Pharynx: Oropharynx is clear.   Eyes:      Conjunctiva/sclera: Conjunctivae normal.   Cardiovascular:      Rate and Rhythm: Normal rate and regular rhythm.      Heart sounds: Normal heart sounds.   Pulmonary:      Breath sounds: Normal breath sounds.   Abdominal:      General: There is no distension.      Palpations: Abdomen is soft.      Tenderness: There is abdominal tenderness. There is no guarding or rebound.      Comments: RLQ tenderness on palpation    Musculoskeletal:         General: Normal range of motion.      Cervical back: Neck supple.   Skin:     General: Skin is warm.   Neurological:      General: No focal deficit present.      Mental Status: She is alert and oriented to person, place, and time. Mental status is at baseline.   Psychiatric:         Mood and Affect: Mood normal.         Behavior: Behavior normal.         Fluids  No intake or output data in the 24 hours ending 10/24/23 1507    Laboratory  Recent Labs     10/23/23  1237 10/23/23  2100 10/24/23  0045   WBC 5.2 5.2 3.8*   RBC 4.09* 4.06* 3.84*   HEMOGLOBIN 13.8 13.6 13.0   HEMATOCRIT 40.2 39.3 37.3   MCV 98.3* 96.8 97.1   MCH 33.7* 33.5* 33.9*   MCHC 34.3 34.6 34.9   RDW 43.3 42.1 42.3   PLATELETCT 238 177 170   MPV 9.8 10.2 10.0     Recent Labs     10/23/23  1237 10/23/23  2100 10/24/23  0045   SODIUM 143 139 143   POTASSIUM 4.1 3.8 3.8    CHLORIDE 104 100 104   CO2 24 24 25   GLUCOSE 93 85 89   BUN 15 13 12   CREATININE 0.73 0.73 0.69   CALCIUM 9.7 9.9 9.3                   Imaging  No orders to display        Assessment/Plan  * Intractable abdominal pain- (present on admission)  Assessment & Plan  -Patient with 6-day history of worsening right lower quadrant pain and CT imaging showing concerning for early appendicitis.  -I appreciate general surgery consult and recommendations, ERP discussed the case with Dr. Shin, planning for laparoscopic appendectomy today   -There is no other significant abnormalities.  Normal WBC, no SIRS.  She is not septic on admission.  -Patient was started on Rocephin and metronidazole which we will continue, likely stop post operatively   -Pain control as needed    Abdominal pain, RLQ  Assessment & Plan  -Possible early appendicitis.  Plans above.    Depression  Assessment & Plan  -Continue paroxetine    Hypothyroidism  Assessment & Plan  Continue levothyroxine.         VTE prophylaxis:   SCDs/TEDs      I have performed a physical exam and reviewed and updated ROS and Plan today (10/24/2023). In review of yesterday's note (10/23/2023), there are no changes except as documented above.

## 2023-10-24 NOTE — PROGRESS NOTES
Laparoscopic Appendectomy D/C instructions:    1. DIET: Upon discharge from the hospital you may resume your normal pre-operative diet. Depending on how you are feeling and whether you have nausea or not, you may wish to stay with a bland diet for the first few days. However, you can advance this as quickly as you feel ready.    2. ACTIVITIES: After discharge from the hospital, you may resume full routine activities. However, there should be no heavy lifting (greater than 15 pounds) and no strenuous activities until after your follow-up visit. Otherwise, routine activities of daily living are acceptable.    3. DRIVING: You may drive whenever you are off pain medications and are able to perform the activities needed to drive, i.e. turning, bending, twisting, etc.    4. BATHING: You may get the wound wet at any time after leaving the hospital. You may shower, but do not submerge in a bath for at least a week.     5. BOWEL FUNCTION: A few patients, after this operation, will develop either frequent or loose stools after meals. This usually corrects itself after a few days, to a few weeks. If this occurs, do not worry; it is not unusual and will resolve. Much more common than loose stools, is constipation. The combination of pain medication and decreased activity level can cause constipation in otherwise normal patients. If you feel this is occurring, take a laxative (Milk of Magnesia, Ex-Lax, Senokot, etc.) until the problem has resolved.    6. PAIN MEDICATION: You will be given a prescription for pain medication at discharge. Please take these as directed. It is important to remember not to take medications on an empty stomach as this may cause nausea.     It is also helpful to take other non-narcotic over the counter medications to help with pain control and to decrease the need for narcotic medications. I recommend ibuprofen, taken every 8 hours, dosing as directed on the medication bottle.    It is useful to  slowly decrease the number of narcotic pain medications you take starting the first day after surgery, as you are able. If you need a refill, Dr. Shin's office will provide you with one refill at your post-operative visit. After this, no more narcotic pain medications will be given.     7. CALL IF YOU HAVE: (1) Fevers to more than 101.5 degrees F, (2) Unusual chest or leg pain, (3) Drainage or fluid from incision that may be foul smelling, increased tenderness or soreness at the wound or the wound edges are no longer together, redness or swelling at the incision site. Please do not hesitate to call with any other questions.     8. APPOINTMENT: Contact our office at [phone] for a follow-up appointment in 1 to 2 weeks following your procedure.    If you have any additional questions, please do not hesitate to call the office and speak to either myself or the physician on call.    Office address:  [address] [place] [address] [id number]  [place] [address]      Shelly Shin M.D.  [facility]  [phone]

## 2023-10-24 NOTE — ED PROVIDER NOTES
ED Provider Note    CHIEF COMPLAINT  Chief Complaint   Patient presents with    Abdominal Pain     Abdominal pain since last Wednesday, pt seen by pcp today, ct today, pcp sent pt to ed for results of acute appendicitis per pt       EXTERNAL RECORDS REVIEWED  Other reviewed a CT scan from 23 October that shows evidence of acute appendicitis    HPI/MARIAM    Adelaide March is a 58 y.o. female who presents with abdominal pain.  The patient states this started last Wednesday.  She states the pain is progressed and is located in the right lower quadrant.  She has had associated anorexia as well as some slight nausea but no vomiting.  She has not had any change in bowel or bladder function.  She has not had any associated fevers.  She had a CT scan performed on an outpatient basis that showed evidence of acute appendicitis and she was instructed by her primary care provider to come into the emergency department for evaluation.  I did review a CBC that is performed that shows a normal white count with no change in the indices.    PAST MEDICAL HISTORY       SURGICAL HISTORY  patient denies any surgical history    FAMILY HISTORY  No family history on file.    SOCIAL HISTORY  Social History     Tobacco Use    Smoking status: Not on file    Smokeless tobacco: Not on file   Substance and Sexual Activity    Alcohol use: Not on file    Drug use: Not on file    Sexual activity: Not on file       CURRENT MEDICATIONS  Home Medications       Reviewed by Jagdish Lerner R.N. (Registered Nurse) on 10/23/23 at 2007  Med List Status: Not Addressed     Medication Last Dose Status        Patient Zion Taking any Medications                           ALLERGIES  No Known Allergies    PHYSICAL EXAM  VITAL SIGNS: BP (!) 158/81   Pulse 66   Temp 36.2 °C (97.2 °F) (Temporal)   Resp 16   Wt 81.2 kg (179 lb)   SpO2 95%    In general the patient does not appear toxic    HEENT    Pulmonary the patient's lungs are clear to auscultation  bilaterally    Cardiovascular S1-S2 with a regular rate and rhythm    GI the patient has focal tenderness in the right lower quadrant with no rebound or guarding.  The patient does not have any distention.    Skin no pallor nor cyanosis    Extremities no edema    Neurologic examination is grossly intact    DIAGNOSTIC STUDIES   Results for orders placed or performed during the hospital encounter of 10/23/23   CBC WITH DIFFERENTIAL   Result Value Ref Range    WBC 5.2 4.8 - 10.8 K/uL    RBC 4.06 (L) 4.20 - 5.40 M/uL    Hemoglobin 13.6 12.0 - 16.0 g/dL    Hematocrit 39.3 37.0 - 47.0 %    MCV 96.8 81.4 - 97.8 fL    MCH 33.5 (H) 27.0 - 33.0 pg    MCHC 34.6 32.2 - 35.5 g/dL    RDW 42.1 35.9 - 50.0 fL    Platelet Count 177 164 - 446 K/uL    MPV 10.2 9.0 - 12.9 fL    Neutrophils-Polys 47.10 44.00 - 72.00 %    Lymphocytes 40.50 22.00 - 41.00 %    Monocytes 10.20 0.00 - 13.40 %    Eosinophils 1.40 0.00 - 6.90 %    Basophils 0.40 0.00 - 1.80 %    Immature Granulocytes 0.40 0.00 - 0.90 %    Nucleated RBC 0.00 0.00 - 0.20 /100 WBC    Neutrophils (Absolute) 2.44 1.82 - 7.42 K/uL    Lymphs (Absolute) 2.10 1.00 - 4.80 K/uL    Monos (Absolute) 0.53 0.00 - 0.85 K/uL    Eos (Absolute) 0.07 0.00 - 0.51 K/uL    Baso (Absolute) 0.02 0.00 - 0.12 K/uL    Immature Granulocytes (abs) 0.02 0.00 - 0.11 K/uL    NRBC (Absolute) 0.00 K/uL     COURSE & MEDICAL DECISION MAKING  This a 58-year-old female who presents with right lower quadrant abdominal pain and suspected appendicitis noted on his CT scan.  I did review the CT scan and the appendix is dilated 1.1 cm however there is no periappendical inflammation and she has a normal white blood cell count.  This still could be consistent with early appendicitis but she had symptoms since Wednesday and therefore not convinced this is from acute appendicitis but not have a clear source otherwise for her abdominal pain.  I did repeat the CBC as well as a CRP and spoke with the general surgeon for  consultation.  We agreed to admit the patient to the hospital overnight for observation and repeat laboratory and Alysis and possible imaging versus serial exams.  I will load the patient with Rocephin and Flagyl for possible early appendicitis.  I also ordered a urinalysis to see if there is any evidence of cystitis.  The patient does not appear septic nor toxic.  She will be admitted to the hospitalist with surgery and consultation.  FINAL DIAGNOSIS  Right lower quadrant abdominal pain    Disposition  Patient will be admitted in stable condition       Electronically signed by: Shahzad Rodriguez M.D., 10/23/2023 9:07 PM

## 2023-10-24 NOTE — H&P
Hospital Medicine History & Physical Note    Date of Service  10/23/2023    Primary Care Physician  Pcp Pt States None    Consultants  general surgery    Specialist Names: ERP discussed with Dr. Shin.     Code Status  Full Code    Chief Complaint  Chief Complaint   Patient presents with    Abdominal Pain     Abdominal pain since last Wednesday, pt seen by pcp today, ct today, pcp sent pt to ed for results of acute appendicitis per pt       History of Presenting Illness  Adelaide March is a 58 y.o. female, sent to the ED by PCP with concern for appendicitis on CT done outpatient earlier in the day.  Who presented to the emergency department on 10/23/2023 with 6-day history of worsening right lower quadrant pain.  Pain is constant and described as sharp in nature.  Patient was seen her PCP and CT scan was ordered with above findings.  On evaluation in the ED, WBC is normal.  She is hypertensive and intermittently bradycardic but no SIRS.  ERP discussed the case with Dr. Shin who will evaluate patient in the morning.  She was a started on Rocephin and metronidazole    I discussed the plan of care with patient and ERP Dr. Rodriguez .    Review of Systems  Review of Systems   Constitutional:  Positive for malaise/fatigue. Negative for fever.   HENT:  Negative for congestion and sore throat.    Eyes:  Negative for blurred vision and double vision.   Respiratory:  Negative for cough and shortness of breath.    Cardiovascular:  Negative for chest pain and palpitations.   Gastrointestinal:  Positive for abdominal pain. Negative for nausea and vomiting.   Genitourinary:  Negative for dysuria and urgency.   Musculoskeletal:  Negative for myalgias and neck pain.   Skin:  Negative for itching and rash.   Neurological:  Negative for dizziness, weakness and headaches.   Endo/Heme/Allergies:  Does not bruise/bleed easily.   Psychiatric/Behavioral:  Negative for depression. The patient does not have insomnia.        Past  Medical History  Hypothyroidism, Depression    Surgical History  Denies recent surgery    Family History  Reviewed and not pertinent  Family history reviewed with patient. There is no family history that is pertinent to the chief complaint.     Social History   Denies tobacco smoking, no daily alcohol use and denies recreational drug use.    Allergies  No Known Allergies    Medications  None       Physical Exam  Temp:  [36.2 °C (97.2 °F)] 36.2 °C (97.2 °F)  Pulse:  [66] 66  Resp:  [16] 16  BP: (158)/(81) 158/81  SpO2:  [95 %] 95 %  Blood Pressure: (!) 158/81   Temperature: 36.2 °C (97.2 °F)   Pulse: 66   Respiration: 16   Pulse Oximetry: 95 %       Physical Exam  Constitutional:       Appearance: Normal appearance.   HENT:      Head: Normocephalic and atraumatic.      Mouth/Throat:      Mouth: Mucous membranes are moist.      Pharynx: Oropharynx is clear.   Eyes:      Extraocular Movements: Extraocular movements intact.      Pupils: Pupils are equal, round, and reactive to light.   Cardiovascular:      Rate and Rhythm: Normal rate and regular rhythm.      Heart sounds: Normal heart sounds.   Pulmonary:      Effort: Pulmonary effort is normal.      Breath sounds: Normal breath sounds.   Abdominal:      General: Abdomen is flat. Bowel sounds are normal. There is no distension.      Palpations: Abdomen is soft.      Tenderness: There is abdominal tenderness (Minimal right lower quadrant tenderness to palpation.). There is no guarding or rebound.   Musculoskeletal:      Cervical back: Normal range of motion and neck supple.   Skin:     General: Skin is warm and dry.   Neurological:      General: No focal deficit present.      Mental Status: She is alert and oriented to person, place, and time.   Psychiatric:         Mood and Affect: Mood normal.         Behavior: Behavior normal.         Laboratory:  Recent Labs     10/23/23  1237 10/23/23  2100   WBC 5.2 5.2   RBC 4.09* 4.06*   HEMOGLOBIN 13.8 13.6   HEMATOCRIT 40.2  "39.3   MCV 98.3* 96.8   MCH 33.7* 33.5*   MCHC 34.3 34.6   RDW 43.3 42.1   PLATELETCT 238 177   MPV 9.8 10.2     Recent Labs     10/23/23  1237   SODIUM 143   POTASSIUM 4.1   CHLORIDE 104   CO2 24   GLUCOSE 93   BUN 15   CREATININE 0.73   CALCIUM 9.7     Recent Labs     10/23/23  1237   ALTSGPT 24   ASTSGOT 23   ALKPHOSPHAT 65   TBILIRUBIN 0.7   GLUCOSE 93         No results for input(s): \"NTPROBNP\" in the last 72 hours.      No results for input(s): \"TROPONINT\" in the last 72 hours.    Imaging:    IMPRESSION:     1.  Enlarged, 1.1 cm appendix, concerning for early acute appendicitis. No periappendiceal fat stranding. No appendiceal abscess.  2.  Colonic diverticulosis.      X-Ray:  I have personally reviewed the images and compared with prior images. and My impression is: CT scan done yesterday but outpatient showed enlarged appendix concerning for early acute appendicitis.    Assessment/Plan:  Justification for Admission Status  I anticipate this patient is appropriate for observation status at this time because 58-year-old female, 6-day history of right lower quadrant pain, CT imaging concern for possible early appendicitis.        * Intractable abdominal pain- (present on admission)  Assessment & Plan  -Observation status to medical floor.  -Patient with 6-day history of worsening right lower quadrant pain and CT imaging showing concerning for early appendicitis.  -I appreciate general surgery consult and recommendations, ERP discussed the case with Dr. Shin.  -There is no other significant abnormalities.  Normal WBC, no SIRS.  She is not septic on admission.  -Appendicitis is equivocal as a progression is not correlating with imaging and or laboratory numbers.  -Patient will be kept n.p.o. until evaluated by surgery.  -Patient was started on Rocephin and metronidazole which we will continue.  -Pain control as needed.    Abdominal pain, RLQ  Assessment & Plan  -Possible early appendicitis.  Plans " above.    Depression  Assessment & Plan  -Continue paroxetine    Hypothyroidism  Assessment & Plan  Continue levothyroxine.        VTE prophylaxis: SCDs/TEDs

## 2023-10-24 NOTE — PROGRESS NOTES
1010 - Pt arrived on floor from ED, A&Ox4, on RA, reviewed orders, and medication requisition and admission profile done. Oriented Pt to the floor. Plan of care discussed with Pt and answered questions asked. Safety precautions in place and hourly rounding established with CNA.

## 2023-10-24 NOTE — CARE PLAN
The patient is Stable - Low risk of patient condition declining or worsening    Shift Goals  Clinical Goals: Surgery today, NPO  Patient Goals: Rest  Family Goals: No family present    Progress made toward(s) clinical / shift goals:    Problem: Pre Op  Goal: Optimal preparation for surgery  Outcome: Progressing  Patient updated on POC for surgery at 1630 today. Patient is NPO, IVF infusing per MAR.          Yes

## 2023-10-24 NOTE — CARE PLAN
The patient is Stable - Low risk of patient condition declining or worsening    Shift Goals  Clinical Goals: remain npo for surgery, monitor for pain  Patient Goals: rest and anticipate surgery    Progress made toward(s) clinical / shift goals:  patient understands importance of diet order prior to a procedure, patient's pain controlled with non-pharmacological interventions    Patient is not progressing towards the following goals: n/a

## 2023-10-24 NOTE — H&P
"Surgical History and Physical    Date: 10/24/2023    Requesting Physician: ER  PCP: Pcp Pt States None  Attending Physician: Shelly Shin M.D. [facility]    CC: \"my right side hurts\"    HPI: This is a 58 y.o. female who is presenting with complaints of right lower quadrant abdominal pain. The pain is in her right lower abdomen, described as cramping, severity is 5/10. It has been going on since the weekend. It is worsened with movement and alleviated by pain medication. Along with this she has had nausea and vomiting. CT at OSH revealed acute appendicitis.     Past Medical History:   Diagnosis Date    Depression     Hypercholesteremia     Hypothyroid        Past Surgical History:   Procedure Laterality Date    HYSTERECTOMY LAPAROSCOPY         Current Facility-Administered Medications   Medication Dose Route Frequency Provider Last Rate Last Admin    cefTRIAXone (Rocephin) 1,000 mg in  mL IVPB  1,000 mg Intravenous Q24HRS Sarah Powell M.D.        metroNIDAZOLE (Flagyl) IVPB 500 mg  500 mg Intravenous Q12HRS Sarah Powell M.D. 100 mL/hr at 10/24/23 0555 500 mg at 10/24/23 0555    levothyroxine (Synthroid) tablet 88 mcg  88 mcg Oral AM ES Sarah Powell M.D.        PARoxetine (Paxil) tablet 20 mg  20 mg Oral Q EVENING Sarah Powell M.D.        acetaminophen (Tylenol) tablet 650 mg  650 mg Oral Q6HRS PRN Sarah Powell M.D.        ondansetron (Zofran) syringe/vial injection 4 mg  4 mg Intravenous Q4HRS PRN Sarah Powell M.D.        ondansetron (Zofran ODT) dispertab 4 mg  4 mg Oral Q4HRS PRN Sarah Powell M.D.        promethazine (Phenergan) tablet 12.5-25 mg  12.5-25 mg Oral Q4HRS PRN Sarah Powell M.D.        promethazine (Phenergan) suppository 12.5-25 mg  12.5-25 mg Rectal Q4HRS PRN Sarah Miley-Lista, M.D.        prochlorperazine (Compazine) injection 5-10 mg  5-10 mg Intravenous Q4HRS PRN Sarah Powell, " M.D.        senna-docusate (Pericolace Or Senokot S) 8.6-50 MG per tablet 2 Tablet  2 Tablet Oral BID Sarah Powell M.D.        And    polyethylene glycol/lytes (Miralax) PACKET 1 Packet  1 Packet Oral QDAY PRN Sarah Powell M.D.        And    magnesium hydroxide (Milk Of Magnesia) suspension 30 mL  30 mL Oral QDAY PRN Sarah Powell M.D.        And    bisacodyl (Dulcolax) suppository 10 mg  10 mg Rectal QDAY PRN Sarah Powell M.D.        HYDROmorphone (Dilaudid) injection 0.5 mg  0.5 mg Intravenous Q3HRS PRN Sarah Powell M.D.        HYDROmorphone (Dilaudid) injection 1 mg  1 mg Intravenous Q3HRS PRN Sarah Powell M.D.           Social History     Socioeconomic History    Marital status: Single     Spouse name: Not on file    Number of children: Not on file    Years of education: Not on file    Highest education level: Not on file   Occupational History    Not on file   Tobacco Use    Smoking status: Never    Smokeless tobacco: Never   Vaping Use    Vaping Use: Not on file   Substance and Sexual Activity    Alcohol use: Not Currently    Drug use: Not Currently    Sexual activity: Not on file   Other Topics Concern    Not on file   Social History Narrative    Not on file     Social Determinants of Health     Financial Resource Strain: Not on file   Food Insecurity: Not on file   Transportation Needs: Not on file   Physical Activity: Not on file   Stress: Not on file   Social Connections: Not on file   Intimate Partner Violence: Not on file   Housing Stability: Not on file       History reviewed. No pertinent family history.    Allergies:  Patient has no known allergies.    Review of Systems:  Constitutional: Negative for fever, chills, weight loss, malaise/fatigue and diaphoresis.   HENT: Negative for hearing loss, ear pain, nosebleeds, congestion, sore throat, neck pain, tinnitus and ear discharge.    Eyes: Negative for blurred vision, double vision,  "photophobia, pain, discharge and redness.   Respiratory: Negative for cough, hemoptysis, sputum production, shortness of breath, wheezing and stridor.    Cardiovascular: Negative for chest pain, palpitations, orthopnea, claudication, leg swelling and PND.   Gastrointestinal: Negative for heartburn, nausea, vomiting, +abdominal pain, diarrhea, constipation, blood in stool and melena.   Genitourinary: Negative for dysuria, urgency, frequency, hematuria and flank pain.   Musculoskeletal: Negative for myalgias, back pain, joint pain and falls.   Skin: Negative for itching and rash.  Neurological: Negative for dizziness, tingling, tremors, sensory change, speech change, focal weakness, seizures, loss of consciousness, weakness and headaches.   Endo/Heme/Allergies: Negative for environmental allergies and polydipsia. Does not bruise/bleed easily.   Psychiatric/Behavioral: Negative for depression, suicidal ideas, hallucinations, memory loss and substance abuse. The patient is not nervous/anxious and does not have insomnia.    Physical Exam:  BP (!) 167/80   Pulse (!) 58   Temp 36.3 °C (97.3 °F) (Temporal)   Resp 16   Ht 1.753 m (5' 9\")   Wt 81.1 kg (178 lb 12.7 oz)   SpO2 98%     Constitutional: she is oriented to person, place, and time.  she appears well-developed and well-nourished. No distress.   Head: Normocephalic and atraumatic.   Neck: Normal range of motion. Neck supple. No JVD present. No tracheal deviation present. No thyromegaly present.   Cardiovascular: Normal rate, regular rhythm, normal heart sounds and intact distal pulses.  Exam reveals no gallop and no friction rub.  No murmur heard.  Pulmonary/Chest: Effort normal and breath sounds normal. No stridor. No respiratory distress. she has no wheezes. she has no rales.   Abdominal: Soft. Bowel sounds are normal. she exhibits no distension and no mass. There is no tenderness. There is no rebound and no guarding. +RLQ pain   Musculoskeletal: Normal range " of motion. she exhibits no edema and no tenderness.   Neurological: she is alert and oriented to person, place, and time. she has normal reflexes. No cranial nerve deficit. Coordination normal.   Skin: Skin is warm and dry. No rash noted. she is not diaphoretic. No erythema. No pallor.   Psychiatric: she has a normal mood and affect.  Behavior is normal.       Labs:  Recent Labs     10/23/23  1237 10/23/23  2100 10/24/23  0045   WBC 5.2 5.2 3.8*   RBC 4.09* 4.06* 3.84*   HEMOGLOBIN 13.8 13.6 13.0   HEMATOCRIT 40.2 39.3 37.3   MCV 98.3* 96.8 97.1   MCH 33.7* 33.5* 33.9*   MCHC 34.3 34.6 34.9   RDW 43.3 42.1 42.3   PLATELETCT 238 177 170   MPV 9.8 10.2 10.0     Recent Labs     10/23/23  1237 10/23/23  2100 10/24/23  0045   SODIUM 143 139 143   POTASSIUM 4.1 3.8 3.8   CHLORIDE 104 100 104   CO2 24 24 25   GLUCOSE 93 85 89   BUN 15 13 12   CREATININE 0.73 0.73 0.69   CALCIUM 9.7 9.9 9.3         Recent Labs     10/23/23  1237 10/23/23  2100   ASTSGOT 23 28   ALTSGPT 24 24   TBILIRUBIN 0.7 0.7   ALKPHOSPHAT 65 66   GLOBULIN 2.8 2.7       Assessment: This is a 58 y.o. female with acute appendicitis.    An extensive procedures, alternative, risks and questions conference was held with the patient  in regard to the surgical treatment of appendicitis. The patient was counseled regarding the benefits of the operation, namely, cure of infection. The patient was made aware of the alternatives, including operative and non-operative management. The risks of bleeding, infection, damage to surrounding structures, need for reoperation, need for open opeation, stroke, MI, and death were discussed with the patient. The patient was given a chance to ask questions, and all her questions were answered. Discussion was undertaken with Layman's terms. The patient and family demonstrated adequate understanding, seemed pleased with the plan, and wish to proceed. Consent was given in clear state of mind.  Consent to be signed.     Plan:  Laparoscopic appendectomy later today.     Thank you very much for this consultation.    Shelly Shin M.D.  [facility]  [phone]

## 2023-10-25 VITALS
HEART RATE: 68 BPM | BODY MASS INDEX: 26.48 KG/M2 | TEMPERATURE: 97.5 F | RESPIRATION RATE: 18 BRPM | OXYGEN SATURATION: 98 % | HEIGHT: 69 IN | DIASTOLIC BLOOD PRESSURE: 65 MMHG | WEIGHT: 178.79 LBS | SYSTOLIC BLOOD PRESSURE: 135 MMHG

## 2023-10-25 PROCEDURE — 700102 HCHG RX REV CODE 250 W/ 637 OVERRIDE(OP): Performed by: HOSPITALIST

## 2023-10-25 PROCEDURE — 96366 THER/PROPH/DIAG IV INF ADDON: CPT

## 2023-10-25 PROCEDURE — 700105 HCHG RX REV CODE 258: Performed by: STUDENT IN AN ORGANIZED HEALTH CARE EDUCATION/TRAINING PROGRAM

## 2023-10-25 PROCEDURE — G0378 HOSPITAL OBSERVATION PER HR: HCPCS

## 2023-10-25 PROCEDURE — A9270 NON-COVERED ITEM OR SERVICE: HCPCS | Performed by: HOSPITALIST

## 2023-10-25 PROCEDURE — 700111 HCHG RX REV CODE 636 W/ 250 OVERRIDE (IP): Performed by: HOSPITALIST

## 2023-10-25 PROCEDURE — 99239 HOSP IP/OBS DSCHRG MGMT >30: CPT | Performed by: STUDENT IN AN ORGANIZED HEALTH CARE EDUCATION/TRAINING PROGRAM

## 2023-10-25 PROCEDURE — 94760 N-INVAS EAR/PLS OXIMETRY 1: CPT

## 2023-10-25 RX ORDER — ACETAMINOPHEN 500 MG
500-1000 TABLET ORAL EVERY 6 HOURS PRN
COMMUNITY
Start: 2023-10-25

## 2023-10-25 RX ADMIN — METRONIDAZOLE 500 MG: 5 INJECTION, SOLUTION INTRAVENOUS at 04:57

## 2023-10-25 RX ADMIN — SENNOSIDES AND DOCUSATE SODIUM 2 TABLET: 50; 8.6 TABLET ORAL at 04:57

## 2023-10-25 RX ADMIN — SODIUM CHLORIDE: 9 INJECTION, SOLUTION INTRAVENOUS at 03:13

## 2023-10-25 RX ADMIN — LEVOTHYROXINE SODIUM 88 MCG: 0.09 TABLET ORAL at 05:03

## 2023-10-25 ASSESSMENT — PAIN DESCRIPTION - PAIN TYPE: TYPE: SURGICAL PAIN

## 2023-10-25 NOTE — OR NURSING
1716: Patient arrived from OR via gurney.  X3 lapsites on abdomen covered in dermabond CDI. Cold pack in place, no complaints of pain or nausea at his time.    Sedation/Resp Status: Eye opening to verbal.  Respirations spontaneous and non-labored.    HR 74SR; VSS on 10L 02 via simple mask.     1730: Family called with updates.     1731: Surgical sites CDI, no complaints of pain or nausea at this time, VSS.     1746: Surgical sites CDI, no complaints of pain or nausea at this time, VSS.     1748: Report called to Mc RN on the floor.     1758: Patient transferred to the floor by transport.

## 2023-10-25 NOTE — PROGRESS NOTES
1900 - Bedside report received from LAURE Bentley. Alert and oriented X4, on RA in no acute respiratory distress. Daily plan of care discussed. Patient complains of no pain just applied ice. No other needs at this time. Call light and personal belongings within reach. Hourly rounding in place. Chart reviewed for recent labs, notes, and orders.    2200 - patient asleep. Diet tray untouched.    0000 - patient sleeping soundly in bed,  in place.

## 2023-10-25 NOTE — ANESTHESIA TIME REPORT
Anesthesia Start and Stop Event Times     Date Time Event    10/24/2023 1629 Ready for Procedure     1649 Anesthesia Start     1719 Anesthesia Stop        Responsible Staff  10/24/23    Name Role Begin End    Dariusz Gusman M.D. Anesth 1646 1719        Overtime Reason:  overtime with call-back    Comments: YARIEL OT CALLBACK

## 2023-10-25 NOTE — ANESTHESIA POSTPROCEDURE EVALUATION
Patient: Adelaide March    Procedure Summary     Date: 10/24/23 Room / Location:  OR  / SURGERY [facility]    Anesthesia Start: 1649 Anesthesia Stop: 1719    Procedure: APPENDECTOMY, LAPAROSCOPIC (Abdomen) Diagnosis:       Acute appendicitis      (Acute Appendicitis)    Surgeons: Shelly Shin M.D. Responsible Provider: Dariusz Gusman M.D.    Anesthesia Type: general ASA Status: 2 - Emergent          Final Anesthesia Type: general  Last vitals  BP   Blood Pressure: (!) 149/74    Temp   35.9 °C (96.6 °F)    Pulse   (!) 51   Resp   18    SpO2   91 %      Anesthesia Post Evaluation    Patient location during evaluation: PACU  Patient participation: complete - patient participated  Level of consciousness: awake and alert  Pain score: 2    Airway patency: patent  Anesthetic complications: no  Cardiovascular status: hemodynamically stable  Respiratory status: acceptable  Hydration status: euvolemic    PONV: none          There were no known notable events for this encounter.     Nurse Pain Score: 0 (NPRS)

## 2023-10-25 NOTE — PROGRESS NOTES
4 Eyes Skin Assessment Completed by Treasure, RN and hSira, RN.    Head WDL  Ears WDL  Nose WDL  Mouth WDL  Neck WDL  Breast/Chest WDL  Shoulder Blades WDL  Spine WDL  (R) Arm/Elbow/Hand WDL  (L) Arm/Elbow/Hand WDL  Abdomen Incision lap sites x 3  Groin WDL  Scrotum/Coccyx/Buttocks WDL  (R) Leg WDL  (L) Leg WDL  (R) Heel/Foot/Toe WDL  (L) Heel/Foot/Toe WDL      Devices In Places Pulse Ox      Interventions In Place Pressure Redistribution Mattress    Possible Skin Injury No    Pictures Uploaded Into Epic N/A  Wound Consult Placed N/A  RN Wound Prevention Protocol Ordered No

## 2023-10-25 NOTE — CARE PLAN
The patient is Stable - Low risk of patient condition declining or worsening    Shift Goals  Clinical Goals: monitor if patient is tolerating diet, assess for pain  Patient Goals: rest and sleep  Family Goals: No family present    Progress made toward(s) clinical / shift goals:  patient did not have an appetite post surgery. Tolerated dessert well with no nausea. Patient states that she has minimal to tolerable pain, not requesting pharmacological interventions.    Patient is not progressing towards the following goals: n/a

## 2023-10-25 NOTE — DISCHARGE SUMMARY
Discharge Summary    CHIEF COMPLAINT ON ADMISSION  Chief Complaint   Patient presents with    Abdominal Pain     Abdominal pain since last Wednesday, pt seen by pcp today, ct today, pcp sent pt to ed for results of acute appendicitis per pt       Reason for Admission  Sent By MD     Admission Date  10/23/2023    CODE STATUS  Full Code    HPI & HOSPITAL COURSE  Adelaide March is a 58 y.o. female with past medical history of hypothyroidism and depression who was sent to the ED by PCP with concern for appendicitis noted on CT done outpatient earlier in the day.  She presented to the emergency department on 10/23/2023 with 6-day history of worsening right lower quadrant pain.  Pain is constant and described as sharp in nature.  Patient was seen her PCP and CT scan was ordered with above findings.  On evaluation in the ED, WBC was normal.  She was hypertensive and intermittently bradycardic but no SIRS criteria on admission. ERP discussed the case with general surgery Dr. Shin who evaluated the patient. She was started on Rocephin and metronidazole for acute appendicitis. She was taken to the OR on 10/24 for laparoscopic appendectomy. She had no immediate post op complications and this morning has minimal pain and is tolerating diet and eager for discharge.     Therefore, she is discharged in fair and stable condition to home with close outpatient follow-up.    The patient recovered much more quickly than anticipated on admission.    Discharge Date  10/25/2023    FOLLOW UP ITEMS POST DISCHARGE  Post surgical follow up     DISCHARGE DIAGNOSES  Principal Problem:    Intractable abdominal pain (POA: Yes)  Active Problems:    Hypothyroidism (POA: Unknown)    Depression (POA: Unknown)    Abdominal pain, RLQ (POA: Unknown)  Resolved Problems:    * No resolved hospital problems. *      FOLLOW UP  No future appointments.  Shelly Shin M.D.  [address] [facility] [address]  [facility] [address]  [phone]    Schedule an  appointment as soon as possible for a visit in 1 week(s)  post op follow up      MEDICATIONS ON DISCHARGE     Medication List        START taking these medications        Instructions   acetaminophen 500 MG Tabs  Commonly known as: Tylenol   Take 1-2 Tablets by mouth every 6 hours as needed for Mild Pain or Moderate Pain.  Dose: 500-1,000 mg            CONTINUE taking these medications        Instructions   APPLE CIDER VINEGAR PO   Take 1 Tablet by mouth every day. Gummy  Dose: 1 Tablet     levonorgestrel-ethinyl estradiol 0.1-20 MG-MCG per tablet  Commonly known as: Aviane   Take 1 Tablet by mouth every day.  Dose: 1 Tablet     levothyroxine 88 MCG Tabs  Commonly known as: Synthroid   Take 88 mcg by mouth every morning on an empty stomach.  Dose: 88 mcg     Magnesium 500 MG Tabs   Take 500 mg by mouth every evening.  Dose: 500 mg     Nexlizet 180-10 MG Tabs  Generic drug: Bempedoic Acid-Ezetimibe   Take 180 mg by mouth every evening.  Dose: 180 mg     PARoxetine 20 MG Tabs  Commonly known as: Paxil   Take 20 mg by mouth every evening.  Dose: 20 mg     Vitamin D3 2000 UNIT Caps   Take 2,000 Units by mouth every day.  Dose: 2,000 Units              Allergies  No Known Allergies    DIET  Orders Placed This Encounter   Procedures    Diet Order Diet: Low Fiber(GI Soft)     Standing Status:   Standing     Number of Occurrences:   1     Order Specific Question:   Diet:     Answer:   Low Fiber(GI Soft) [2]       ACTIVITY  As tolerated.  15-lb lifting restriction    CONSULTATIONS  General surgery     PROCEDURES  10/24/2023  Laparoscopy, surgical, appendectomy    LABORATORY  Lab Results   Component Value Date    SODIUM 143 10/24/2023    POTASSIUM 3.8 10/24/2023    CHLORIDE 104 10/24/2023    CO2 25 10/24/2023    GLUCOSE 89 10/24/2023    BUN 12 10/24/2023    CREATININE 0.69 10/24/2023        Lab Results   Component Value Date    WBC 3.8 (L) 10/24/2023    HEMOGLOBIN 13.0 10/24/2023    HEMATOCRIT 37.3 10/24/2023    PLATELETCT  170 10/24/2023        Total time of the discharge process exceeds 45 minutes.

## 2023-10-25 NOTE — DISCHARGE INSTRUCTIONS
Laparoscopic Appendectomy D/C instructions:     1. DIET: Upon discharge from the hospital you may resume your normal pre-operative diet. Depending on how you are feeling and whether you have nausea or not, you may wish to stay with a bland diet for the first few days. However, you can advance this as quickly as you feel ready.     2. ACTIVITIES: After discharge from the hospital, you may resume full routine activities. However, there should be no heavy lifting (greater than 15 pounds) and no strenuous activities until after your follow-up visit. Otherwise, routine activities of daily living are acceptable.     3. DRIVING: You may drive whenever you are off pain medications and are able to perform the activities needed to drive, i.e. turning, bending, twisting, etc.     4. BATHING: You may get the wound wet at any time after leaving the hospital. You may shower, but do not submerge in a bath for at least a week.      5. BOWEL FUNCTION: A few patients, after this operation, will develop either frequent or loose stools after meals. This usually corrects itself after a few days, to a few weeks. If this occurs, do not worry; it is not unusual and will resolve. Much more common than loose stools, is constipation. The combination of pain medication and decreased activity level can cause constipation in otherwise normal patients. If you feel this is occurring, take a laxative (Milk of Magnesia, Ex-Lax, Senokot, etc.) until the problem has resolved.     6. PAIN MEDICATION: You will be given a prescription for pain medication at discharge. Please take these as directed. It is important to remember not to take medications on an empty stomach as this may cause nausea.      It is also helpful to take other non-narcotic over the counter medications to help with pain control and to decrease the need for narcotic medications. I recommend ibuprofen, taken every 8 hours, dosing as directed on the medication bottle.     It is  useful to slowly decrease the number of narcotic pain medications you take starting the first day after surgery, as you are able. If you need a refill, Dr. Shin's office will provide you with one refill at your post-operative visit. After this, no more narcotic pain medications will be given.      7. CALL IF YOU HAVE: (1) Fevers to more than 101.5 degrees F, (2) Unusual chest or leg pain, (3) Drainage or fluid from incision that may be foul smelling, increased tenderness or soreness at the wound or the wound edges are no longer together, redness or swelling at the incision site. Please do not hesitate to call with any other questions.      8. APPOINTMENT: Contact our office at [phone] for a follow-up appointment in 1 to 2 weeks following your procedure.     If you have any additional questions, please do not hesitate to call the office and speak to either myself or the physician on call.     Office address:  [address] [place] [address] [id number]  [place] [id number]

## 2023-10-25 NOTE — PROGRESS NOTES
Patient arrived on unit from PACU. Patient vomiting after ambulation to bed from gurney, compazine administered per MAR. Patient states pain is 2/10 which is tolerable for her. Patient updated on post op POC.

## 2024-03-26 NOTE — PROCEDURE: COUNSELING
Fluid overload
Detail Level: Detailed
Detail Level: Generalized
Detail Level: Simple
Laser Recommendations: KTP laser quoted 440$

## 2024-05-07 ENCOUNTER — APPOINTMENT (RX ONLY)
Dept: URBAN - METROPOLITAN AREA CLINIC 35 | Facility: CLINIC | Age: 59
Setting detail: DERMATOLOGY
End: 2024-05-07

## 2024-05-07 DIAGNOSIS — D17 BENIGN LIPOMATOUS NEOPLASM: ICD-10-CM

## 2024-05-07 DIAGNOSIS — D22 MELANOCYTIC NEVI: ICD-10-CM

## 2024-05-07 DIAGNOSIS — L98.8 OTHER SPECIFIED DISORDERS OF THE SKIN AND SUBCUTANEOUS TISSUE: ICD-10-CM

## 2024-05-07 DIAGNOSIS — L82.1 OTHER SEBORRHEIC KERATOSIS: ICD-10-CM

## 2024-05-07 DIAGNOSIS — D18.0 HEMANGIOMA: ICD-10-CM

## 2024-05-07 DIAGNOSIS — L81.4 OTHER MELANIN HYPERPIGMENTATION: ICD-10-CM

## 2024-05-07 DIAGNOSIS — Z71.89 OTHER SPECIFIED COUNSELING: ICD-10-CM

## 2024-05-07 PROBLEM — D22.5 MELANOCYTIC NEVI OF TRUNK: Status: ACTIVE | Noted: 2024-05-07

## 2024-05-07 PROBLEM — D17.1 BENIGN LIPOMATOUS NEOPLASM OF SKIN AND SUBCUTANEOUS TISSUE OF TRUNK: Status: ACTIVE | Noted: 2024-05-07

## 2024-05-07 PROBLEM — D18.01 HEMANGIOMA OF SKIN AND SUBCUTANEOUS TISSUE: Status: ACTIVE | Noted: 2024-05-07

## 2024-05-07 PROCEDURE — ? COUNSELING

## 2024-05-07 PROCEDURE — 99213 OFFICE O/P EST LOW 20 MIN: CPT

## 2024-05-07 PROCEDURE — ? SUNSCREEN RECOMMENDATIONS

## 2024-05-07 ASSESSMENT — LOCATION DETAILED DESCRIPTION DERM
LOCATION DETAILED: RIGHT MEDIAL SUPERIOR CHEST
LOCATION DETAILED: LEFT POSTERIOR SHOULDER
LOCATION DETAILED: RIGHT SUPERIOR UPPER BACK
LOCATION DETAILED: INFERIOR THORACIC SPINE
LOCATION DETAILED: LEFT INFERIOR VERMILION LIP

## 2024-05-07 ASSESSMENT — LOCATION ZONE DERM
LOCATION ZONE: TRUNK
LOCATION ZONE: LIP
LOCATION ZONE: ARM

## 2024-05-07 ASSESSMENT — LOCATION SIMPLE DESCRIPTION DERM
LOCATION SIMPLE: RIGHT UPPER BACK
LOCATION SIMPLE: UPPER BACK
LOCATION SIMPLE: LEFT LIP
LOCATION SIMPLE: LEFT SHOULDER
LOCATION SIMPLE: CHEST

## 2025-08-20 DIAGNOSIS — Z00.6 CLINICAL TRIAL PARTICIPANT: ICD-10-CM

## 2025-08-29 ENCOUNTER — HOSPITAL ENCOUNTER (OUTPATIENT)
Dept: LAB | Facility: MEDICAL CENTER | Age: 60
End: 2025-08-29
Attending: FAMILY MEDICINE

## 2025-08-29 DIAGNOSIS — Z00.6 CLINICAL TRIAL PARTICIPANT: ICD-10-CM

## (undated) DEVICE — TUBE CONNECTING SUCTION - CLEAR PLASTIC STERILE 72 IN (50EA/CA)

## (undated) DEVICE — SUTURE 0 COATED VICRYL 6-18IN - (12PK/BX)

## (undated) DEVICE — GLOVE BIOGEL SZ 6.5 SURGICAL PF LTX (50PR/BX 4BX/CA)

## (undated) DEVICE — CHLORAPREP 26 ML APPLICATOR - ORANGE TINT(25/CA)

## (undated) DEVICE — LIGASURE LAPAROSCOPIC 5MM - (6EA/CA)

## (undated) DEVICE — NEEDLE INSUFFLATION FOR STEP - (12/BX)

## (undated) DEVICE — SUTURE 4-0 MONOCRYL PLUS PS-2 - 27 INCH (36/BX)

## (undated) DEVICE — ELECTRODE DUAL RETURN W/ CORD - (50/PK)

## (undated) DEVICE — Device

## (undated) DEVICE — TROCAR 5X100 BLADED Z-THREAD - KII (6/BX)

## (undated) DEVICE — SLEEVE, VASO, THIGH, MED

## (undated) DEVICE — SUTURE 0 VICRYL PLUS UR-6 - 27 INCH (36/BX)

## (undated) DEVICE — SUTURE GENERAL

## (undated) DEVICE — GOWN WARMING STANDARD FLEX - (30/CA)

## (undated) DEVICE — BAG RETRIEVAL 10ML (10EA/BX)

## (undated) DEVICE — SODIUM CHL IRRIGATION 0.9% 1000ML (12EA/CA)

## (undated) DEVICE — CANNULA W/SEAL 5X100 Z-THRE - ADED KII (12/BX)